# Patient Record
Sex: FEMALE | Race: ASIAN | NOT HISPANIC OR LATINO | URBAN - METROPOLITAN AREA
[De-identification: names, ages, dates, MRNs, and addresses within clinical notes are randomized per-mention and may not be internally consistent; named-entity substitution may affect disease eponyms.]

---

## 2018-07-18 PROBLEM — Z00.00 ENCOUNTER FOR PREVENTIVE HEALTH EXAMINATION: Status: ACTIVE | Noted: 2018-07-18

## 2018-07-20 ENCOUNTER — OUTPATIENT (OUTPATIENT)
Dept: OUTPATIENT SERVICES | Facility: HOSPITAL | Age: 23
LOS: 1 days | End: 2018-07-20
Payer: COMMERCIAL

## 2018-07-20 ENCOUNTER — NON-APPOINTMENT (OUTPATIENT)
Age: 23
End: 2018-07-20

## 2018-07-20 ENCOUNTER — APPOINTMENT (OUTPATIENT)
Dept: THORACIC SURGERY | Facility: CLINIC | Age: 23
End: 2018-07-20
Payer: COMMERCIAL

## 2018-07-20 VITALS
TEMPERATURE: 97.5 F | RESPIRATION RATE: 18 BRPM | OXYGEN SATURATION: 99 % | HEART RATE: 68 BPM | BODY MASS INDEX: 24.15 KG/M2 | HEIGHT: 60 IN | WEIGHT: 123 LBS | DIASTOLIC BLOOD PRESSURE: 71 MMHG | SYSTOLIC BLOOD PRESSURE: 107 MMHG

## 2018-07-20 DIAGNOSIS — F17.200 NICOTINE DEPENDENCE, UNSPECIFIED, UNCOMPLICATED: ICD-10-CM

## 2018-07-20 DIAGNOSIS — J45.909 UNSPECIFIED ASTHMA, UNCOMPLICATED: ICD-10-CM

## 2018-07-20 DIAGNOSIS — Z78.9 OTHER SPECIFIED HEALTH STATUS: ICD-10-CM

## 2018-07-20 DIAGNOSIS — Z77.098 CONTACT WITH AND (SUSPECTED) EXPOSURE TO OTHER HAZARDOUS, CHIEFLY NONMEDICINAL, CHEMICALS: ICD-10-CM

## 2018-07-20 LAB
ALBUMIN SERPL ELPH-MCNC: 4.5 G/DL — SIGNIFICANT CHANGE UP (ref 3.3–5)
ALP SERPL-CCNC: 33 U/L — LOW (ref 40–120)
ALT FLD-CCNC: 11 U/L — SIGNIFICANT CHANGE UP (ref 10–45)
ANION GAP SERPL CALC-SCNC: 13 MMOL/L — SIGNIFICANT CHANGE UP (ref 5–17)
AST SERPL-CCNC: 19 U/L — SIGNIFICANT CHANGE UP (ref 10–40)
BASOPHILS NFR BLD AUTO: 0.7 % — SIGNIFICANT CHANGE UP (ref 0–2)
BILIRUB SERPL-MCNC: 0.6 MG/DL — SIGNIFICANT CHANGE UP (ref 0.2–1.2)
BUN SERPL-MCNC: 14 MG/DL — SIGNIFICANT CHANGE UP (ref 7–23)
CALCIUM SERPL-MCNC: 9.2 MG/DL — SIGNIFICANT CHANGE UP (ref 8.4–10.5)
CHLORIDE SERPL-SCNC: 101 MMOL/L — SIGNIFICANT CHANGE UP (ref 96–108)
CHOLEST SERPL-MCNC: 216 MG/DL — HIGH (ref 10–199)
CO2 SERPL-SCNC: 25 MMOL/L — SIGNIFICANT CHANGE UP (ref 22–31)
CREAT SERPL-MCNC: 0.7 MG/DL — SIGNIFICANT CHANGE UP (ref 0.5–1.3)
EOSINOPHIL NFR BLD AUTO: 5.2 % — SIGNIFICANT CHANGE UP (ref 0–6)
GLUCOSE SERPL-MCNC: 77 MG/DL — SIGNIFICANT CHANGE UP (ref 70–99)
HBA1C BLD-MCNC: 5.5 % — SIGNIFICANT CHANGE UP (ref 4–5.6)
HBV SURFACE AG SER-ACNC: SIGNIFICANT CHANGE UP
HCG SERPL-ACNC: <.1 MIU/ML — SIGNIFICANT CHANGE UP
HCT VFR BLD CALC: 38.3 % — SIGNIFICANT CHANGE UP (ref 34.5–45)
HDLC SERPL-MCNC: 49 MG/DL — SIGNIFICANT CHANGE UP (ref 40–125)
HGB BLD-MCNC: 12.5 G/DL — SIGNIFICANT CHANGE UP (ref 11.5–15.5)
LDH SERPL L TO P-CCNC: 196 U/L — SIGNIFICANT CHANGE UP (ref 50–242)
LIPID PNL WITH DIRECT LDL SERPL: 151 MG/DL — HIGH
LYMPHOCYTES # BLD AUTO: 30.5 % — SIGNIFICANT CHANGE UP (ref 13–44)
MCHC RBC-ENTMCNC: 28.2 PG — SIGNIFICANT CHANGE UP (ref 27–34)
MCHC RBC-ENTMCNC: 32.6 G/DL — SIGNIFICANT CHANGE UP (ref 32–36)
MCV RBC AUTO: 86.3 FL — SIGNIFICANT CHANGE UP (ref 80–100)
MONOCYTES NFR BLD AUTO: 6.6 % — SIGNIFICANT CHANGE UP (ref 2–14)
NEUTROPHILS NFR BLD AUTO: 57 % — SIGNIFICANT CHANGE UP (ref 43–77)
PLATELET # BLD AUTO: 230 K/UL — SIGNIFICANT CHANGE UP (ref 150–400)
POTASSIUM SERPL-MCNC: 4.2 MMOL/L — SIGNIFICANT CHANGE UP (ref 3.5–5.3)
POTASSIUM SERPL-SCNC: 4.2 MMOL/L — SIGNIFICANT CHANGE UP (ref 3.5–5.3)
PROT SERPL-MCNC: 7.7 G/DL — SIGNIFICANT CHANGE UP (ref 6–8.3)
RBC # BLD: 4.44 M/UL — SIGNIFICANT CHANGE UP (ref 3.8–5.2)
RBC # FLD: 13.4 % — SIGNIFICANT CHANGE UP (ref 10.3–16.9)
SODIUM SERPL-SCNC: 139 MMOL/L — SIGNIFICANT CHANGE UP (ref 135–145)
TOTAL CHOLESTEROL/HDL RATIO MEASUREMENT: 4.4 RATIO — SIGNIFICANT CHANGE UP (ref 3.3–7.1)
TRIGL SERPL-MCNC: 80 MG/DL — SIGNIFICANT CHANGE UP (ref 10–149)
TSH SERPL-MCNC: 0.53 UIU/ML — SIGNIFICANT CHANGE UP (ref 0.35–4.94)
WBC # BLD: 4.1 K/UL — SIGNIFICANT CHANGE UP (ref 3.8–10.5)
WBC # FLD AUTO: 4.1 K/UL — SIGNIFICANT CHANGE UP (ref 3.8–10.5)

## 2018-07-20 PROCEDURE — 86041 ACETYLCHOLN RCPTR BNDNG ANTB: CPT

## 2018-07-20 PROCEDURE — 83036 HEMOGLOBIN GLYCOSYLATED A1C: CPT

## 2018-07-20 PROCEDURE — 80061 LIPID PANEL: CPT

## 2018-07-20 PROCEDURE — 85025 COMPLETE CBC W/AUTO DIFF WBC: CPT

## 2018-07-20 PROCEDURE — 84702 CHORIONIC GONADOTROPIN TEST: CPT

## 2018-07-20 PROCEDURE — 36415 COLL VENOUS BLD VENIPUNCTURE: CPT

## 2018-07-20 PROCEDURE — 99205 OFFICE O/P NEW HI 60 MIN: CPT

## 2018-07-20 PROCEDURE — 83615 LACTATE (LD) (LDH) ENZYME: CPT

## 2018-07-20 PROCEDURE — 82105 ALPHA-FETOPROTEIN SERUM: CPT

## 2018-07-20 PROCEDURE — 87340 HEPATITIS B SURFACE AG IA: CPT

## 2018-07-20 PROCEDURE — 84443 ASSAY THYROID STIM HORMONE: CPT

## 2018-07-20 PROCEDURE — 80053 COMPREHEN METABOLIC PANEL: CPT

## 2018-07-21 LAB — AFP-TM SERPL-MCNC: 1.3 NG/ML — SIGNIFICANT CHANGE UP

## 2018-07-23 ENCOUNTER — OUTPATIENT (OUTPATIENT)
Dept: OUTPATIENT SERVICES | Facility: HOSPITAL | Age: 23
LOS: 1 days | End: 2018-07-23
Payer: COMMERCIAL

## 2018-07-23 DIAGNOSIS — J98.59 OTHER DISEASES OF MEDIASTINUM, NOT ELSEWHERE CLASSIFIED: ICD-10-CM

## 2018-07-23 LAB
APTT BLD: 31.6 SEC — SIGNIFICANT CHANGE UP (ref 27.5–37.4)
BLD GP AB SCN SERPL QL: NEGATIVE — SIGNIFICANT CHANGE UP
BLD GP AB SCN SERPL QL: NEGATIVE — SIGNIFICANT CHANGE UP
INR BLD: 1.04 — SIGNIFICANT CHANGE UP (ref 0.88–1.16)
PROTHROM AB SERPL-ACNC: 11.5 SEC — SIGNIFICANT CHANGE UP (ref 9.8–12.7)
RH IG SCN BLD-IMP: POSITIVE — SIGNIFICANT CHANGE UP
RH IG SCN BLD-IMP: POSITIVE — SIGNIFICANT CHANGE UP

## 2018-07-23 PROCEDURE — 86850 RBC ANTIBODY SCREEN: CPT

## 2018-07-23 PROCEDURE — 85730 THROMBOPLASTIN TIME PARTIAL: CPT

## 2018-07-23 PROCEDURE — 85610 PROTHROMBIN TIME: CPT

## 2018-07-23 PROCEDURE — 86900 BLOOD TYPING SEROLOGIC ABO: CPT

## 2018-07-23 PROCEDURE — 86901 BLOOD TYPING SEROLOGIC RH(D): CPT

## 2018-07-29 ENCOUNTER — FORM ENCOUNTER (OUTPATIENT)
Age: 23
End: 2018-07-29

## 2018-07-30 ENCOUNTER — RESULT REVIEW (OUTPATIENT)
Age: 23
End: 2018-07-30

## 2018-07-30 ENCOUNTER — APPOINTMENT (OUTPATIENT)
Dept: INTERVENTIONAL RADIOLOGY/VASCULAR | Facility: HOSPITAL | Age: 23
End: 2018-07-30
Payer: COMMERCIAL

## 2018-07-30 ENCOUNTER — APPOINTMENT (OUTPATIENT)
Dept: CT IMAGING | Facility: HOSPITAL | Age: 23
End: 2018-07-30
Payer: COMMERCIAL

## 2018-07-30 ENCOUNTER — OUTPATIENT (OUTPATIENT)
Dept: OUTPATIENT SERVICES | Facility: HOSPITAL | Age: 23
LOS: 1 days | End: 2018-07-30
Payer: COMMERCIAL

## 2018-07-30 PROCEDURE — 71045 X-RAY EXAM CHEST 1 VIEW: CPT

## 2018-07-30 PROCEDURE — C1769: CPT

## 2018-07-30 PROCEDURE — 32405: CPT

## 2018-07-30 PROCEDURE — 88173 CYTOPATH EVAL FNA REPORT: CPT

## 2018-07-30 PROCEDURE — 71045 X-RAY EXAM CHEST 1 VIEW: CPT | Mod: 26

## 2018-07-30 PROCEDURE — 71046 X-RAY EXAM CHEST 2 VIEWS: CPT | Mod: 26

## 2018-07-30 PROCEDURE — 88305 TISSUE EXAM BY PATHOLOGIST: CPT

## 2018-07-30 PROCEDURE — 77012 CT SCAN FOR NEEDLE BIOPSY: CPT

## 2018-07-30 PROCEDURE — 77012 CT SCAN FOR NEEDLE BIOPSY: CPT | Mod: 26

## 2018-07-31 LAB
NON-GYNECOLOGICAL CYTOLOGY STUDY: SIGNIFICANT CHANGE UP
SURGICAL PATHOLOGY STUDY: SIGNIFICANT CHANGE UP

## 2018-08-03 ENCOUNTER — APPOINTMENT (OUTPATIENT)
Dept: THORACIC SURGERY | Facility: CLINIC | Age: 23
End: 2018-08-03
Payer: COMMERCIAL

## 2018-08-03 ENCOUNTER — NON-APPOINTMENT (OUTPATIENT)
Age: 23
End: 2018-08-03

## 2018-08-03 ENCOUNTER — OUTPATIENT (OUTPATIENT)
Dept: OUTPATIENT SERVICES | Facility: HOSPITAL | Age: 23
LOS: 1 days | End: 2018-08-03
Payer: COMMERCIAL

## 2018-08-03 VITALS
TEMPERATURE: 97.7 F | OXYGEN SATURATION: 99 % | HEART RATE: 83 BPM | SYSTOLIC BLOOD PRESSURE: 114 MMHG | WEIGHT: 124 LBS | DIASTOLIC BLOOD PRESSURE: 80 MMHG | RESPIRATION RATE: 19 BRPM

## 2018-08-03 LAB
ALBUMIN SERPL ELPH-MCNC: 4.7 G/DL — SIGNIFICANT CHANGE UP (ref 3.3–5)
ALP SERPL-CCNC: 37 U/L — LOW (ref 40–120)
ALT FLD-CCNC: 15 U/L — SIGNIFICANT CHANGE UP (ref 10–45)
ANION GAP SERPL CALC-SCNC: 14 MMOL/L — SIGNIFICANT CHANGE UP (ref 5–17)
APPEARANCE UR: ABNORMAL
APTT BLD: 34.9 SEC — SIGNIFICANT CHANGE UP (ref 27.5–37.4)
AST SERPL-CCNC: 19 U/L — SIGNIFICANT CHANGE UP (ref 10–40)
BASOPHILS NFR BLD AUTO: 0.4 % — SIGNIFICANT CHANGE UP (ref 0–2)
BILIRUB SERPL-MCNC: 0.8 MG/DL — SIGNIFICANT CHANGE UP (ref 0.2–1.2)
BILIRUB UR-MCNC: NEGATIVE — SIGNIFICANT CHANGE UP
BLD GP AB SCN SERPL QL: NEGATIVE — SIGNIFICANT CHANGE UP
BUN SERPL-MCNC: 15 MG/DL — SIGNIFICANT CHANGE UP (ref 7–23)
CALCIUM SERPL-MCNC: 9.7 MG/DL — SIGNIFICANT CHANGE UP (ref 8.4–10.5)
CHLORIDE SERPL-SCNC: 99 MMOL/L — SIGNIFICANT CHANGE UP (ref 96–108)
CHOLEST SERPL-MCNC: 196 MG/DL — SIGNIFICANT CHANGE UP (ref 10–199)
CO2 SERPL-SCNC: 26 MMOL/L — SIGNIFICANT CHANGE UP (ref 22–31)
COLOR SPEC: YELLOW — SIGNIFICANT CHANGE UP
CREAT SERPL-MCNC: 0.68 MG/DL — SIGNIFICANT CHANGE UP (ref 0.5–1.3)
DIFF PNL FLD: NEGATIVE — SIGNIFICANT CHANGE UP
EOSINOPHIL NFR BLD AUTO: 6.9 % — HIGH (ref 0–6)
EXTRA SST TUBE: SIGNIFICANT CHANGE UP
GLUCOSE SERPL-MCNC: 69 MG/DL — LOW (ref 70–99)
GLUCOSE UR QL: NEGATIVE — SIGNIFICANT CHANGE UP
HBA1C BLD-MCNC: 5.3 % — SIGNIFICANT CHANGE UP (ref 4–5.6)
HCT VFR BLD CALC: 38.3 % — SIGNIFICANT CHANGE UP (ref 34.5–45)
HDLC SERPL-MCNC: 45 MG/DL — SIGNIFICANT CHANGE UP (ref 40–125)
HGB BLD-MCNC: 12.6 G/DL — SIGNIFICANT CHANGE UP (ref 11.5–15.5)
INR BLD: 1.04 — SIGNIFICANT CHANGE UP (ref 0.88–1.16)
KETONES UR-MCNC: NEGATIVE — SIGNIFICANT CHANGE UP
LEUKOCYTE ESTERASE UR-ACNC: NEGATIVE — SIGNIFICANT CHANGE UP
LIPID PNL WITH DIRECT LDL SERPL: 111 MG/DL — SIGNIFICANT CHANGE UP
LYMPHOCYTES # BLD AUTO: 21.1 % — SIGNIFICANT CHANGE UP (ref 13–44)
MCHC RBC-ENTMCNC: 28.1 PG — SIGNIFICANT CHANGE UP (ref 27–34)
MCHC RBC-ENTMCNC: 32.9 G/DL — SIGNIFICANT CHANGE UP (ref 32–36)
MCV RBC AUTO: 85.3 FL — SIGNIFICANT CHANGE UP (ref 80–100)
MONOCYTES NFR BLD AUTO: 11.6 % — SIGNIFICANT CHANGE UP (ref 2–14)
NEUTROPHILS NFR BLD AUTO: 60 % — SIGNIFICANT CHANGE UP (ref 43–77)
NITRITE UR-MCNC: NEGATIVE — SIGNIFICANT CHANGE UP
PH UR: 7 — SIGNIFICANT CHANGE UP (ref 5–8)
PLATELET # BLD AUTO: 248 K/UL — SIGNIFICANT CHANGE UP (ref 150–400)
POTASSIUM SERPL-MCNC: 4.3 MMOL/L — SIGNIFICANT CHANGE UP (ref 3.5–5.3)
POTASSIUM SERPL-SCNC: 4.3 MMOL/L — SIGNIFICANT CHANGE UP (ref 3.5–5.3)
PROT SERPL-MCNC: 8 G/DL — SIGNIFICANT CHANGE UP (ref 6–8.3)
PROT UR-MCNC: NEGATIVE MG/DL — SIGNIFICANT CHANGE UP
PROTHROM AB SERPL-ACNC: 11.6 SEC — SIGNIFICANT CHANGE UP (ref 9.8–12.7)
RBC # BLD: 4.49 M/UL — SIGNIFICANT CHANGE UP (ref 3.8–5.2)
RBC # FLD: 13.2 % — SIGNIFICANT CHANGE UP (ref 10.3–16.9)
RH IG SCN BLD-IMP: POSITIVE — SIGNIFICANT CHANGE UP
SODIUM SERPL-SCNC: 139 MMOL/L — SIGNIFICANT CHANGE UP (ref 135–145)
SP GR SPEC: 1.01 — SIGNIFICANT CHANGE UP (ref 1–1.03)
TOTAL CHOLESTEROL/HDL RATIO MEASUREMENT: 4.4 RATIO — SIGNIFICANT CHANGE UP (ref 3.3–7.1)
TRIGL SERPL-MCNC: 199 MG/DL — HIGH (ref 10–149)
TSH SERPL-MCNC: 0.36 UIU/ML — SIGNIFICANT CHANGE UP (ref 0.35–4.94)
UROBILINOGEN FLD QL: 0.2 E.U./DL — SIGNIFICANT CHANGE UP
WBC # BLD: 4.9 K/UL — SIGNIFICANT CHANGE UP (ref 3.8–10.5)
WBC # FLD AUTO: 4.9 K/UL — SIGNIFICANT CHANGE UP (ref 3.8–10.5)

## 2018-08-03 PROCEDURE — 85610 PROTHROMBIN TIME: CPT

## 2018-08-03 PROCEDURE — 84443 ASSAY THYROID STIM HORMONE: CPT

## 2018-08-03 PROCEDURE — 80053 COMPREHEN METABOLIC PANEL: CPT

## 2018-08-03 PROCEDURE — 85730 THROMBOPLASTIN TIME PARTIAL: CPT

## 2018-08-03 PROCEDURE — 99215 OFFICE O/P EST HI 40 MIN: CPT

## 2018-08-03 PROCEDURE — 85025 COMPLETE CBC W/AUTO DIFF WBC: CPT

## 2018-08-03 PROCEDURE — 86900 BLOOD TYPING SEROLOGIC ABO: CPT

## 2018-08-03 PROCEDURE — 86901 BLOOD TYPING SEROLOGIC RH(D): CPT

## 2018-08-03 PROCEDURE — 83036 HEMOGLOBIN GLYCOSYLATED A1C: CPT

## 2018-08-03 PROCEDURE — 86850 RBC ANTIBODY SCREEN: CPT

## 2018-08-03 PROCEDURE — 80061 LIPID PANEL: CPT

## 2018-08-06 DIAGNOSIS — J98.59 OTHER DISEASES OF MEDIASTINUM, NOT ELSEWHERE CLASSIFIED: ICD-10-CM

## 2018-08-07 LAB — ACRM MODULATING ANTIBODY: SIGNIFICANT CHANGE UP

## 2018-08-30 VITALS
WEIGHT: 128.31 LBS | SYSTOLIC BLOOD PRESSURE: 103 MMHG | DIASTOLIC BLOOD PRESSURE: 63 MMHG | OXYGEN SATURATION: 100 % | HEART RATE: 67 BPM | TEMPERATURE: 97 F | HEIGHT: 62 IN | RESPIRATION RATE: 18 BRPM

## 2018-08-30 NOTE — PRE-OP CHECKLIST - SELECT TESTS ORDERED
Type and Screen/PT/PTT/EKG/CXR/CBC/CMP/INR/Results in MD note Results in MD note/EKG/CXR/CMP/PT/PTT/INR/CBC/Type and Screen/ucg- Results in MD note/EKG/PT/PTT/Type and Screen/CXR/INR/CBC/CMP/ucg-negative Results in MD note/Type and Screen/CBC/PT/PTT/INR/EKG/CXR/CMP/ucg-negative, neuro clearance

## 2018-08-30 NOTE — PATIENT PROFILE ADULT. - PMH
Childhood asthma Childhood asthma    Seizure  pt reports seizure as infant post last surgry at 2 months old

## 2018-08-31 ENCOUNTER — RESULT REVIEW (OUTPATIENT)
Age: 23
End: 2018-08-31

## 2018-08-31 ENCOUNTER — APPOINTMENT (OUTPATIENT)
Dept: THORACIC SURGERY | Facility: HOSPITAL | Age: 23
End: 2018-08-31

## 2018-08-31 ENCOUNTER — INPATIENT (INPATIENT)
Facility: HOSPITAL | Age: 23
LOS: 2 days | Discharge: HOME | DRG: 164 | End: 2018-09-03
Attending: THORACIC SURGERY (CARDIOTHORACIC VASCULAR SURGERY) | Admitting: THORACIC SURGERY (CARDIOTHORACIC VASCULAR SURGERY)
Payer: COMMERCIAL

## 2018-08-31 DIAGNOSIS — Z87.448 PERSONAL HISTORY OF OTHER DISEASES OF URINARY SYSTEM: Chronic | ICD-10-CM

## 2018-08-31 LAB
ANION GAP SERPL CALC-SCNC: 12 MMOL/L — SIGNIFICANT CHANGE UP (ref 5–17)
APTT BLD: 27.4 SEC — LOW (ref 27.5–37.4)
BASOPHILS NFR BLD AUTO: 0.1 % — SIGNIFICANT CHANGE UP (ref 0–2)
BUN SERPL-MCNC: 12 MG/DL — SIGNIFICANT CHANGE UP (ref 7–23)
CALCIUM SERPL-MCNC: 8.2 MG/DL — LOW (ref 8.4–10.5)
CHLORIDE SERPL-SCNC: 100 MMOL/L — SIGNIFICANT CHANGE UP (ref 96–108)
CO2 SERPL-SCNC: 23 MMOL/L — SIGNIFICANT CHANGE UP (ref 22–31)
CREAT SERPL-MCNC: 0.71 MG/DL — SIGNIFICANT CHANGE UP (ref 0.5–1.3)
EOSINOPHIL NFR BLD AUTO: 0.1 % — SIGNIFICANT CHANGE UP (ref 0–6)
GLUCOSE SERPL-MCNC: 135 MG/DL — HIGH (ref 70–99)
HCT VFR BLD CALC: 31.6 % — LOW (ref 34.5–45)
HGB BLD-MCNC: 10.3 G/DL — LOW (ref 11.5–15.5)
INR BLD: 1.11 — SIGNIFICANT CHANGE UP (ref 0.88–1.16)
LYMPHOCYTES # BLD AUTO: 8.2 % — LOW (ref 13–44)
MAGNESIUM SERPL-MCNC: 1.6 MG/DL — SIGNIFICANT CHANGE UP (ref 1.6–2.6)
MCHC RBC-ENTMCNC: 28.2 PG — SIGNIFICANT CHANGE UP (ref 27–34)
MCHC RBC-ENTMCNC: 32.6 G/DL — SIGNIFICANT CHANGE UP (ref 32–36)
MCV RBC AUTO: 86.6 FL — SIGNIFICANT CHANGE UP (ref 80–100)
MONOCYTES NFR BLD AUTO: 0.9 % — LOW (ref 2–14)
NEUTROPHILS NFR BLD AUTO: 90.7 % — HIGH (ref 43–77)
PLATELET # BLD AUTO: 206 K/UL — SIGNIFICANT CHANGE UP (ref 150–400)
POTASSIUM SERPL-MCNC: 4.6 MMOL/L — SIGNIFICANT CHANGE UP (ref 3.5–5.3)
POTASSIUM SERPL-SCNC: 4.6 MMOL/L — SIGNIFICANT CHANGE UP (ref 3.5–5.3)
PROTHROM AB SERPL-ACNC: 12.3 SEC — SIGNIFICANT CHANGE UP (ref 9.8–12.7)
RBC # BLD: 3.65 M/UL — LOW (ref 3.8–5.2)
RBC # FLD: 13.7 % — SIGNIFICANT CHANGE UP (ref 10.3–16.9)
SODIUM SERPL-SCNC: 135 MMOL/L — SIGNIFICANT CHANGE UP (ref 135–145)
WBC # BLD: 12.6 K/UL — HIGH (ref 3.8–10.5)
WBC # FLD AUTO: 12.6 K/UL — HIGH (ref 3.8–10.5)

## 2018-08-31 PROCEDURE — S2900 ROBOTIC SURGICAL SYSTEM: CPT | Mod: NC

## 2018-08-31 PROCEDURE — 32662 THORACOSCOPY W/MEDIAST EXC: CPT | Mod: 59

## 2018-08-31 PROCEDURE — 71045 X-RAY EXAM CHEST 1 VIEW: CPT | Mod: 26

## 2018-08-31 PROCEDURE — 32673 THORACOSCOPY W/THYMUS RESECT: CPT | Mod: 22

## 2018-08-31 PROCEDURE — 32673 THORACOSCOPY W/THYMUS RESECT: CPT | Mod: AS

## 2018-08-31 PROCEDURE — S2900 ROBOTIC SURGICAL SYSTEM: CPT | Mod: AS

## 2018-08-31 PROCEDURE — 32662 THORACOSCOPY W/MEDIAST EXC: CPT | Mod: AS,59

## 2018-08-31 RX ORDER — BUPIVACAINE 13.3 MG/ML
20 INJECTION, SUSPENSION, LIPOSOMAL INFILTRATION ONCE
Qty: 0 | Refills: 0 | Status: DISCONTINUED | OUTPATIENT
Start: 2018-08-31 | End: 2018-09-01

## 2018-08-31 RX ORDER — KETOROLAC TROMETHAMINE 30 MG/ML
30 SYRINGE (ML) INJECTION EVERY 6 HOURS
Qty: 0 | Refills: 0 | Status: DISCONTINUED | OUTPATIENT
Start: 2018-08-31 | End: 2018-09-01

## 2018-08-31 RX ORDER — SODIUM CHLORIDE 9 MG/ML
1000 INJECTION, SOLUTION INTRAVENOUS
Qty: 0 | Refills: 0 | Status: DISCONTINUED | OUTPATIENT
Start: 2018-08-31 | End: 2018-09-01

## 2018-08-31 RX ORDER — HEPARIN SODIUM 5000 [USP'U]/ML
5000 INJECTION INTRAVENOUS; SUBCUTANEOUS EVERY 8 HOURS
Qty: 0 | Refills: 0 | Status: DISCONTINUED | OUTPATIENT
Start: 2018-08-31 | End: 2018-09-03

## 2018-08-31 RX ORDER — ACETAMINOPHEN 500 MG
1000 TABLET ORAL ONCE
Qty: 0 | Refills: 0 | Status: COMPLETED | OUTPATIENT
Start: 2018-08-31 | End: 2018-09-01

## 2018-08-31 RX ORDER — MAGNESIUM OXIDE 400 MG ORAL TABLET 241.3 MG
800 TABLET ORAL ONCE
Qty: 0 | Refills: 0 | Status: COMPLETED | OUTPATIENT
Start: 2018-08-31 | End: 2018-08-31

## 2018-08-31 RX ORDER — HYDROMORPHONE HYDROCHLORIDE 2 MG/ML
1 INJECTION INTRAMUSCULAR; INTRAVENOUS; SUBCUTANEOUS
Qty: 0 | Refills: 0 | Status: DISCONTINUED | OUTPATIENT
Start: 2018-08-31 | End: 2018-09-01

## 2018-08-31 RX ORDER — CEFAZOLIN SODIUM 1 G
2000 VIAL (EA) INJECTION EVERY 8 HOURS
Qty: 0 | Refills: 0 | Status: DISCONTINUED | OUTPATIENT
Start: 2018-08-31 | End: 2018-08-31

## 2018-08-31 RX ORDER — ONDANSETRON 8 MG/1
4 TABLET, FILM COATED ORAL ONCE
Qty: 0 | Refills: 0 | Status: COMPLETED | OUTPATIENT
Start: 2018-08-31 | End: 2018-08-31

## 2018-08-31 RX ORDER — DOCUSATE SODIUM 100 MG
100 CAPSULE ORAL THREE TIMES A DAY
Qty: 0 | Refills: 0 | Status: DISCONTINUED | OUTPATIENT
Start: 2018-08-31 | End: 2018-09-03

## 2018-08-31 RX ORDER — ONDANSETRON 8 MG/1
4 TABLET, FILM COATED ORAL EVERY 4 HOURS
Qty: 0 | Refills: 0 | Status: DISCONTINUED | OUTPATIENT
Start: 2018-08-31 | End: 2018-09-03

## 2018-08-31 RX ORDER — KETOROLAC TROMETHAMINE 30 MG/ML
15 SYRINGE (ML) INJECTION ONCE
Qty: 0 | Refills: 0 | Status: DISCONTINUED | OUTPATIENT
Start: 2018-08-31 | End: 2018-08-31

## 2018-08-31 RX ORDER — LIDOCAINE 4 G/100G
1 CREAM TOPICAL DAILY
Qty: 0 | Refills: 0 | Status: DISCONTINUED | OUTPATIENT
Start: 2018-08-31 | End: 2018-09-03

## 2018-08-31 RX ORDER — SODIUM CHLORIDE 9 MG/ML
1000 INJECTION, SOLUTION INTRAVENOUS
Qty: 0 | Refills: 0 | Status: DISCONTINUED | OUTPATIENT
Start: 2018-08-31 | End: 2018-09-03

## 2018-08-31 RX ORDER — METOCLOPRAMIDE HCL 10 MG
10 TABLET ORAL ONCE
Qty: 0 | Refills: 0 | Status: COMPLETED | OUTPATIENT
Start: 2018-08-31 | End: 2018-08-31

## 2018-08-31 RX ORDER — KETOROLAC TROMETHAMINE 30 MG/ML
15 SYRINGE (ML) INJECTION EVERY 6 HOURS
Qty: 0 | Refills: 0 | Status: DISCONTINUED | OUTPATIENT
Start: 2018-08-31 | End: 2018-08-31

## 2018-08-31 RX ORDER — ACETAMINOPHEN 500 MG
1000 TABLET ORAL ONCE
Qty: 0 | Refills: 0 | Status: COMPLETED | OUTPATIENT
Start: 2018-08-31 | End: 2018-08-31

## 2018-08-31 RX ORDER — FAMOTIDINE 10 MG/ML
20 INJECTION INTRAVENOUS
Qty: 0 | Refills: 0 | Status: DISCONTINUED | OUTPATIENT
Start: 2018-08-31 | End: 2018-09-03

## 2018-08-31 RX ORDER — CEFAZOLIN SODIUM 1 G
2000 VIAL (EA) INJECTION EVERY 8 HOURS
Qty: 0 | Refills: 0 | Status: COMPLETED | OUTPATIENT
Start: 2018-08-31 | End: 2018-09-01

## 2018-08-31 RX ADMIN — Medication 30 MILLIGRAM(S): at 23:42

## 2018-08-31 RX ADMIN — Medication 2000 MILLIGRAM(S): at 21:38

## 2018-08-31 RX ADMIN — Medication 1000 MILLIGRAM(S): at 16:30

## 2018-08-31 RX ADMIN — ONDANSETRON 4 MILLIGRAM(S): 8 TABLET, FILM COATED ORAL at 13:14

## 2018-08-31 RX ADMIN — Medication 30 MILLIGRAM(S): at 22:42

## 2018-08-31 RX ADMIN — Medication 15 MILLIGRAM(S): at 19:31

## 2018-08-31 RX ADMIN — Medication 15 MILLIGRAM(S): at 18:10

## 2018-08-31 RX ADMIN — Medication 2000 MILLIGRAM(S): at 13:46

## 2018-08-31 RX ADMIN — LIDOCAINE 1 PATCH: 4 CREAM TOPICAL at 15:22

## 2018-08-31 RX ADMIN — ONDANSETRON 4 MILLIGRAM(S): 8 TABLET, FILM COATED ORAL at 19:53

## 2018-08-31 RX ADMIN — MAGNESIUM OXIDE 400 MG ORAL TABLET 800 MILLIGRAM(S): 241.3 TABLET ORAL at 18:23

## 2018-08-31 RX ADMIN — Medication 10 MILLIGRAM(S): at 15:21

## 2018-08-31 RX ADMIN — Medication 15 MILLIGRAM(S): at 18:35

## 2018-08-31 RX ADMIN — Medication 100 MILLIGRAM(S): at 21:38

## 2018-08-31 RX ADMIN — Medication 15 MILLIGRAM(S): at 18:38

## 2018-08-31 RX ADMIN — HEPARIN SODIUM 5000 UNIT(S): 5000 INJECTION INTRAVENOUS; SUBCUTANEOUS at 21:38

## 2018-08-31 RX ADMIN — Medication 400 MILLIGRAM(S): at 15:21

## 2018-08-31 RX ADMIN — HEPARIN SODIUM 5000 UNIT(S): 5000 INJECTION INTRAVENOUS; SUBCUTANEOUS at 14:19

## 2018-08-31 RX ADMIN — FAMOTIDINE 20 MILLIGRAM(S): 10 INJECTION INTRAVENOUS at 18:23

## 2018-08-31 NOTE — H&P ADULT - NSHPPHYSICALEXAM_GEN_ALL_CORE
CONSTITUTIONAL:    WNL  NEURO:  WNL                      EYES:   WNL  ENMT:   WNL  CV:   WNL  RESPIRATORY:  WNL  GI:  WNL  :  WNL  MUSKULOSKELETAL:  WNL  SKIN / BREAST:  WNL

## 2018-08-31 NOTE — PROGRESS NOTE ADULT - SUBJECTIVE AND OBJECTIVE BOX
POST-OP RECOVERY NOTE    Operation / Date: R VATS Removal of Anterior Mediastinal Mass    SUBJECTIVE ASSESSMENT:  23y Female     Vital Signs Last 24 Hrs  T(C): 36.2 (31 Aug 2018 12:20), Max: 97.2 (31 Aug 2018 12:20)  T(F): 97.2 (31 Aug 2018 12:20), Max: 97.2 (31 Aug 2018 12:20)  HR: 102 (31 Aug 2018 14:05) (90 - 102)  BP: 118/75 (31 Aug 2018 14:05) (109/65 - 119/75)  BP(mean): 88 (31 Aug 2018 12:35) (88 - 94)  RR: 14 (31 Aug 2018 14:05) (8 - 21)  SpO2: 98% (31 Aug 2018 14:05) (98% - 99%)  I&O's Detail    31 Aug 2018 07:01  -  31 Aug 2018 14:26  --------------------------------------------------------  IN:    lactated ringers.: 150 mL  Total IN: 150 mL    OUT:    Chest Tube: 70 mL    Voided: 25 mL  Total OUT: 95 mL    Total NET: 55 mL      CHEST TUBE:  Yes/No. AIR LEAKS: Yes/No. Suction / H2O SEAL.   ROSALIE DRAIN:  Yes/No.  EPICARDIAL WIRES: Yes/No.  TIE DOWNS: Yes/No.  BURGOS: Yes/No.    PHYSICAL EXAM:  General:   Neurological:  Cardiovascular:  Respiratory:  Gastrointestinal:  Extremities:  Vascular:  Incision Sites:    LABS:                        10.3   12.6  )-----------( 206      ( 31 Aug 2018 13:27 )             31.6       COUMADIN:  Yes/No. REASON: .    PT/INR - ( 31 Aug 2018 13:27 )   PT: 12.3 sec;   INR: 1.11          PTT - ( 31 Aug 2018 13:27 )  PTT:27.4 sec    08-31    135  |  100  |  12  ----------------------------<  135<H>  4.6   |  23  |  0.71    Ca    8.2<L>      31 Aug 2018 13:27  Mg     1.6     08-31      MEDICATIONS  (STANDING):  acetaminophen  IVPB. 1000 milliGRAM(s) IV Intermittent once  BUpivacaine liposome 1.3% Injectable (no eMAR) 20 milliLiter(s) Local Injection once  ceFAZolin  Injectable. 2000 milliGRAM(s) IV Push every 8 hours  docusate sodium 100 milliGRAM(s) Oral three times a day  famotidine    Tablet 20 milliGRAM(s) Oral two times a day  heparin  Injectable 5000 Unit(s) SubCutaneous every 8 hours  lactated ringers. 1000 milliLiter(s) (50 mL/Hr) IV Continuous <Continuous>  lidocaine   Patch 1 Patch Transdermal daily  magnesium oxide 800 milliGRAM(s) Oral once  multiple electrolytes Injection Type 1 1000 milliLiter(s) (250 mL/Hr) IV Continuous <Continuous>    MEDICATIONS  (PRN):      RADIOLOGY & ADDITIONAL TESTS: POST-OP RECOVERY NOTE    Operation / Date: R VATS Removal of Anterior Mediastinal Mass    SUBJECTIVE ASSESSMENT:  23y Female seen at bedside post-operatively.  She reports moderate incisional pain but feels that she can stand it at the moment and doesn't want to take pain medications.  She did have some nausea and 2 episodes of vomiting immediately post-op but states that this has resolved completely after zofran given.  Denies HA, AMS, CP, palpitations, SOB, cough, hemoptysis, fever.     Vital Signs Last 24 Hrs  T(C): 36.2 (31 Aug 2018 12:20), Max: 97.2 (31 Aug 2018 12:20)  T(F): 97.2 (31 Aug 2018 12:20), Max: 97.2 (31 Aug 2018 12:20)  HR: 102 (31 Aug 2018 14:05) (90 - 102)  BP: 118/75 (31 Aug 2018 14:05) (109/65 - 119/75)  BP(mean): 88 (31 Aug 2018 12:35) (88 - 94)  RR: 14 (31 Aug 2018 14:05) (8 - 21)  SpO2: 98% (31 Aug 2018 14:05) (98% - 99%)  I&O's Detail    31 Aug 2018 07:01  -  31 Aug 2018 14:26  --------------------------------------------------------  IN:    lactated ringers.: 150 mL  Total IN: 150 mL    OUT:    Chest Tube: 70 mL    Voided: 25 mL  Total OUT: 95 mL    Total NET: 55 mL    CHEST TUBE:  Yes- R lateral chest wall, no air leak on suction.   TIE DOWNS: Yes.  BURGOS: No.     PHYSICAL EXAM:  General: Resting comfortably in bed, no acute distress.   Neurological: AAOx3, no AMS or focal deficits. Answers questions appropriately, responds appropriately to verbal and physical stimuli.   Cardiovascular: RRR, S1/S2, no m/r/g.   Respiratory: No acute distress on 3L NC.  Chest tube evident on R lateral chest wall, no crepitus.  CTA b/l in anterior lung fields, unable to auscultate posterior lung fields.   Gastrointestinal: ND, NBS, non-TTP.  Extremities: Warm and well perfused, no calf ttp b/l.  No edema b/l.   Vascular: Pulses 2+ throughout.   Incision sites: R VATS: C/D/I    LABS:                        10.3   12.6  )-----------( 206      ( 31 Aug 2018 13:27 )             31.6       COUMADIN:  No.  PT/INR - ( 31 Aug 2018 13:27 )   PT: 12.3 sec;   INR: 1.11          PTT - ( 31 Aug 2018 13:27 )  PTT:27.4 sec    08-31    135  |  100  |  12  ----------------------------<  135<H>  4.6   |  23  |  0.71    Ca    8.2<L>      31 Aug 2018 13:27  Mg     1.6     08-31    MEDICATIONS  (STANDING):  acetaminophen  IVPB. 1000 milliGRAM(s) IV Intermittent once  BUpivacaine liposome 1.3% Injectable (no eMAR) 20 milliLiter(s) Local Injection once  ceFAZolin  Injectable. 2000 milliGRAM(s) IV Push every 8 hours  docusate sodium 100 milliGRAM(s) Oral three times a day  famotidine    Tablet 20 milliGRAM(s) Oral two times a day  heparin  Injectable 5000 Unit(s) SubCutaneous every 8 hours  lactated ringers. 1000 milliLiter(s) (50 mL/Hr) IV Continuous <Continuous>  lidocaine   Patch 1 Patch Transdermal daily  magnesium oxide 800 milliGRAM(s) Oral once  multiple electrolytes Injection Type 1 1000 milliLiter(s) (250 mL/Hr) IV Continuous <Continuous>    MEDICATIONS  (PRN):      RADIOLOGY & ADDITIONAL TESTS: POST-OP RECOVERY NOTE    Operation / Date: R VATS RA Removal of Anterior Mediastinal Mass    SUBJECTIVE ASSESSMENT:  23y Female seen at bedside post-operatively.  She reports moderate incisional pain but feels that she can stand it at the moment and doesn't want to take pain medications.  She did have some nausea and 2 episodes of vomiting immediately post-op but states that this has resolved completely after zofran given.  Denies HA, AMS, CP, palpitations, SOB, cough, hemoptysis, fever.     Vital Signs Last 24 Hrs  T(C): 36.2 (31 Aug 2018 12:20), Max: 97.2 (31 Aug 2018 12:20)  T(F): 97.2 (31 Aug 2018 12:20), Max: 97.2 (31 Aug 2018 12:20)  HR: 102 (31 Aug 2018 14:05) (90 - 102)  BP: 118/75 (31 Aug 2018 14:05) (109/65 - 119/75)  BP(mean): 88 (31 Aug 2018 12:35) (88 - 94)  RR: 14 (31 Aug 2018 14:05) (8 - 21)  SpO2: 98% (31 Aug 2018 14:05) (98% - 99%)  I&O's Detail    31 Aug 2018 07:01  -  31 Aug 2018 14:26  --------------------------------------------------------  IN:    lactated ringers.: 150 mL  Total IN: 150 mL    OUT:    Chest Tube: 70 mL    Voided: 25 mL  Total OUT: 95 mL    Total NET: 55 mL    CHEST TUBE:  Yes- R lateral chest wall, no air leak on suction.   TIE DOWNS: Yes.  BURGOS: No.     PHYSICAL EXAM:  General: Resting comfortably in bed, no acute distress.   Neurological: AAOx3, no AMS or focal deficits. Answers questions appropriately, responds appropriately to verbal and physical stimuli.   Cardiovascular: RRR, S1/S2, no m/r/g.   Respiratory: No acute distress on 3L NC.  Chest tube evident on R lateral chest wall, no crepitus.  CTA b/l in anterior lung fields, unable to auscultate posterior lung fields.   Gastrointestinal: ND, NBS, non-TTP.  Extremities: Warm and well perfused, no calf ttp b/l.  No edema b/l.   Vascular: Pulses 2+ throughout.   Incision sites: R VATS: C/D/I    LABS:                        10.3   12.6  )-----------( 206      ( 31 Aug 2018 13:27 )             31.6       COUMADIN:  No.  PT/INR - ( 31 Aug 2018 13:27 )   PT: 12.3 sec;   INR: 1.11          PTT - ( 31 Aug 2018 13:27 )  PTT:27.4 sec    08-31    135  |  100  |  12  ----------------------------<  135<H>  4.6   |  23  |  0.71    Ca    8.2<L>      31 Aug 2018 13:27  Mg     1.6     08-31    MEDICATIONS  (STANDING):  acetaminophen  IVPB. 1000 milliGRAM(s) IV Intermittent once  BUpivacaine liposome 1.3% Injectable (no eMAR) 20 milliLiter(s) Local Injection once  ceFAZolin  Injectable. 2000 milliGRAM(s) IV Push every 8 hours  docusate sodium 100 milliGRAM(s) Oral three times a day  famotidine    Tablet 20 milliGRAM(s) Oral two times a day  heparin  Injectable 5000 Unit(s) SubCutaneous every 8 hours  lactated ringers. 1000 milliLiter(s) (50 mL/Hr) IV Continuous <Continuous>  lidocaine   Patch 1 Patch Transdermal daily  magnesium oxide 800 milliGRAM(s) Oral once  multiple electrolytes Injection Type 1 1000 milliLiter(s) (250 mL/Hr) IV Continuous <Continuous>    MEDICATIONS  (PRN):      RADIOLOGY & ADDITIONAL TESTS:

## 2018-08-31 NOTE — H&P ADULT - ASSESSMENT
24 y/o female with hx of social smoking( 1cigarette q6months x2 years), hx of childhood asthma, hx of seizures at 2 months old who initially presented with cough x3 months treated with Z saadia and Doxy with no improvement. She was sent for a CXR on 7/12/18 which showed a 8.2 cm convex opacity projecting over the Rt Hilar/suprahilar region. CT chest 7/17 revealed a 5.6 x 3.7 x 6.8 cm Right anterior mediastinal amorphous mass, directly adjacent to the lt brachiocephalic vein, mildly heterogeneous and punctate calcifications with focal perifissural RML scarring, and multiple splenic hypodense lesions measuring up to 2.2 cm. Patient is s/p FNA of the mediastinal mass which was negative for malignant cells and mediastinum showed fibroadipose tissue and smooth muscle with foca pigmented macrophage and lymphoplasmacytic infiltrate. She underwent medical and neurology clearance and now presents for her planned Right VATS, RA resection of anterior mediastinal mass.       - pt NPO since midnight  - chart reviewed  - consent obtained  - 2 PRBC on hold for OR  - beta Hcg negative  - plan for PACU/9LA post op

## 2018-08-31 NOTE — H&P ADULT - HISTORY OF PRESENT ILLNESS
24 y/o female with hx of social smoking( 1cigarette q6months x2 years), hx of childhood asthma, hx of seizures at 2 months old who initially presented with cough x3 months treated with Z saadia and Doxy with no improvement. She was sent for a CXR on 7/12/18 which showed a 8.2 cm convex opacity projecting over the Rt Hilar/suprahilar region. CT chest 7/17 revealed a 5.6 x 3.7 x 6.8 cm Right anterior mediastinal amorphous mass, directly adjacent to the lt brachiocephalic vein, mildly heterogeneous and punctate calcifications with focal perifissural RML scarring, and multiple splenic hypodense lesions measuring up to 2.2 cm. Patient is s/p FNA of the mediastinal mass which was negative for malignant cells and mediastinum showed fibroadipose tissue and smooth muscle with foca pigmented macrophage and lymphoplasmacytic infiltrate. She underwent medical and neurology clearance and now presents for her planned Right VATS, RA resection of anterior mediastinal mass. She was seen in [facility] and denies any acute complaints and is in her usual state of health.

## 2018-08-31 NOTE — H&P ADULT - NSHPREVIEWOFSYSTEMS_GEN_ALL_CORE
Review of Systems  CONSTITUTIONAL:  Denies Fevers / chills, sweats, fatigue, weight loss, weight gain                                      NEURO:  Denies parathesias, seizures, syncope, confusion                                                                                EYES:  Denies Blurry vision, discharge, pain, loss of vision                                                                                    ENMT:  Denies Difficulty hearing, vertigo, dysphagia, epistaxis, recent dental work                                       CV:  Denies Chest pain, palpitations, STRATTON, orthopnea                                                                                          RESPIRATORY:  Denies Wheezing, SOB, cough / sputum, hemoptysis                                                                GI:  Denies Nausea, vommiting, diarrhea, constipation, melena, difficulty swallowing                                               : Denies Hematuria, dysuria, urgency, incontinence                                                                                         MUSKULOSKELETAL:  Denies arthritis, joint swelling, muscle weakness                                                             SKIN/BREAST:  Denies rash, itching, alex loss, masses                                                                                            PSYCH:  Denies depresion, anxiety, suicidal ideation                                                                                               HEME/LYMPH:  Denies bruises easily, enlarged lymph nodes, tender lymph nodes                                        ENDOCRINE:  Denies cold intolerance, heat intolerance, polydipsia

## 2018-08-31 NOTE — BRIEF OPERATIVE NOTE - PROCEDURE
<<-----Click on this checkbox to enter Procedure VATS, robot-assisted  08/31/2018  Right VATS, RA, removal of anterior mediastinal mass  Active  AARTEMOU

## 2018-08-31 NOTE — PROGRESS NOTE ADULT - ASSESSMENT
23 year old female with history of social     22 y/o female with hx of social smoking( 1cigarette q6months x2 years), hx of childhood asthma, hx of seizures at 2 months old who initially presented with cough x3 months treated with Z saadia and Doxy with no improvement. She was sent for a CXR on 7/12/18 which showed a 8.2 cm convex opacity projecting over the Rt Hilar/suprahilar region. CT chest 7/17 revealed a 5.6 x 3.7 x 6.8 cm Right anterior mediastinal amorphous mass, directly adjacent to the lt brachiocephalic vein, mildly heterogeneous and punctate calcifications with focal perifissural RML scarring, and multiple splenic hypodense lesions measuring up to 2.2 cm. Patient is s/p FNA of the mediastinal mass which was negative for malignant cells and mediastinum showed fibroadipose tissue and smooth muscle with foca pigmented macrophage and lymphoplasmacytic infiltrate. She underwent medical and neurology clearance and now presents for her planned Right VATS, RA resection of anterior mediastinal mass. 23 year old female, social infrequent smoker, with history of childhood asthma, seizures as an infant who initially presented with cough x 3 months not improved with antibiotic treatment with CXR on 7/12/18 showing 8.2cm right anterior chest mass and CT Chest on 7/17/18    22 y/o female with hx of social smoking( 1cigarette q6months x2 years), hx of childhood asthma, hx of seizures at 2 months old who initially presented with cough x3 months treated with Z saadia and Doxy with no improvement. She was sent for a CXR on 7/12/18 which showed a 8.2 cm convex opacity projecting over the Rt Hilar/suprahilar region. CT chest 7/17 revealed a 5.6 x 3.7 x 6.8 cm Right anterior mediastinal amorphous mass, directly adjacent to the lt brachiocephalic vein, mildly heterogeneous and punctate calcifications with focal perifissural RML scarring, and multiple splenic hypodense lesions measuring up to 2.2 cm. Patient is s/p FNA of the mediastinal mass which was negative for malignant cells and mediastinum showed fibroadipose tissue and smooth muscle with foca pigmented macrophage and lymphoplasmacytic infiltrate. She underwent medical and neurology clearance and now presents for her planned Right VATS, RA resection of anterior mediastinal mass. 23 year old female, social infrequent smoker, with history of childhood asthma, seizures as an infant who initially presented with cough x 3 months not improved with antibiotic treatment with CXR on 7/12/18 showing 8.2cm right chest mass and CT Chest on 7/17/18 revealed 5.6x3.7x6.8cm right anterior mediastinal amorphous mass, directly adjacent to the left brachiocephalic vein, mildly heterogeneous and punctate calcifications with focal perifissural RML scarring, and multiple splenic hypodense lesions measuring up to 2.2 cm.  She underwent FNA of said mass as an outpatient which was negative for malignancy, was referred to Dr. Mendoza for surgical evaluation, completed the remainder of pre-surgical testing and was admitted to [facility] on 8/31/18 where she underwent R VATS robotic removal of anterior mediastinal mass.  She arrived extubated to PACU in stable condition with 1 chest tube in place, no air leak on suction.      Neurovascular: No delirium, pain well managed on current regimen  -C/w PRNs for Pain control  -Monitor neuro status    Respiratory: Saturates well on 3L NC.  History above, POD 0  -CT to remain on suction overnight  -Post-op CXR with no obvious ptx, repeat in AM  -Encourage IS 10x/hour while awake, Cough and deep breathing exercises  -Monitor respiratory status via SpO2  -Continue to wean NC as tolerated    Cardiovascular: No acute issues.  HR controlled, HD stable.   -Monitor HR/BP/Tele    GI: Nausea and vomiting post-op  -Zofran PRN, Reglan 10mg IV given x 1.  Continue to monitor.   -Prophylaxis: Pepcid  -C/w bowel regimen  -Remain NPO while nauseous, ADAT     /Renal:   -BUN/Cr: 12/0.71  -Trend Cr on AM labs  -Replete electrolytes as needed    ID: Afebrile, asymptomatic  -WCC: 12.6  -Complete periop abx  -Continue to monitor for SIRS/Sepsis syndrome while inpatient    Endo: No acute issues.   -A1C: 5.3  -TSH: 0.360    Heme:   -H/H: 10.3/31.6  -CBC, chem in AM  -DVT ppx: HSQ 5000 u q8h and SCDs    Disposition: Home when medically appropriate. No

## 2018-09-01 LAB
ANION GAP SERPL CALC-SCNC: 14 MMOL/L — SIGNIFICANT CHANGE UP (ref 5–17)
BUN SERPL-MCNC: 9 MG/DL — SIGNIFICANT CHANGE UP (ref 7–23)
CALCIUM SERPL-MCNC: 8.6 MG/DL — SIGNIFICANT CHANGE UP (ref 8.4–10.5)
CHLORIDE SERPL-SCNC: 99 MMOL/L — SIGNIFICANT CHANGE UP (ref 96–108)
CO2 SERPL-SCNC: 25 MMOL/L — SIGNIFICANT CHANGE UP (ref 22–31)
CREAT SERPL-MCNC: 0.63 MG/DL — SIGNIFICANT CHANGE UP (ref 0.5–1.3)
GLUCOSE SERPL-MCNC: 101 MG/DL — HIGH (ref 70–99)
HCG SERPL-ACNC: <.1 MIU/ML — SIGNIFICANT CHANGE UP
HCT VFR BLD CALC: 31.4 % — LOW (ref 34.5–45)
HGB BLD-MCNC: 10.4 G/DL — LOW (ref 11.5–15.5)
MAGNESIUM SERPL-MCNC: 1.9 MG/DL — SIGNIFICANT CHANGE UP (ref 1.6–2.6)
MCHC RBC-ENTMCNC: 28.2 PG — SIGNIFICANT CHANGE UP (ref 27–34)
MCHC RBC-ENTMCNC: 33.1 G/DL — SIGNIFICANT CHANGE UP (ref 32–36)
MCV RBC AUTO: 85.1 FL — SIGNIFICANT CHANGE UP (ref 80–100)
PLATELET # BLD AUTO: 215 K/UL — SIGNIFICANT CHANGE UP (ref 150–400)
POTASSIUM SERPL-MCNC: 3.7 MMOL/L — SIGNIFICANT CHANGE UP (ref 3.5–5.3)
POTASSIUM SERPL-SCNC: 3.7 MMOL/L — SIGNIFICANT CHANGE UP (ref 3.5–5.3)
RBC # BLD: 3.69 M/UL — LOW (ref 3.8–5.2)
RBC # FLD: 13.4 % — SIGNIFICANT CHANGE UP (ref 10.3–16.9)
SODIUM SERPL-SCNC: 138 MMOL/L — SIGNIFICANT CHANGE UP (ref 135–145)
WBC # BLD: 10.2 K/UL — SIGNIFICANT CHANGE UP (ref 3.8–10.5)
WBC # FLD AUTO: 10.2 K/UL — SIGNIFICANT CHANGE UP (ref 3.8–10.5)

## 2018-09-01 PROCEDURE — 71045 X-RAY EXAM CHEST 1 VIEW: CPT | Mod: 26

## 2018-09-01 RX ORDER — FENTANYL CITRATE 50 UG/ML
12.5 INJECTION INTRAVENOUS ONCE
Qty: 0 | Refills: 0 | Status: DISCONTINUED | OUTPATIENT
Start: 2018-09-01 | End: 2018-09-01

## 2018-09-01 RX ORDER — ACETAMINOPHEN 500 MG
1000 TABLET ORAL ONCE
Qty: 0 | Refills: 0 | Status: COMPLETED | OUTPATIENT
Start: 2018-09-01 | End: 2018-09-01

## 2018-09-01 RX ORDER — KETOROLAC TROMETHAMINE 30 MG/ML
30 SYRINGE (ML) INJECTION EVERY 6 HOURS
Qty: 0 | Refills: 0 | Status: DISCONTINUED | OUTPATIENT
Start: 2018-09-01 | End: 2018-09-03

## 2018-09-01 RX ORDER — POTASSIUM CHLORIDE 20 MEQ
40 PACKET (EA) ORAL ONCE
Qty: 0 | Refills: 0 | Status: COMPLETED | OUTPATIENT
Start: 2018-09-01 | End: 2018-09-01

## 2018-09-01 RX ORDER — FENTANYL CITRATE 50 UG/ML
12.5 INJECTION INTRAVENOUS
Qty: 0 | Refills: 0 | Status: DISCONTINUED | OUTPATIENT
Start: 2018-09-01 | End: 2018-09-02

## 2018-09-01 RX ORDER — MAGNESIUM OXIDE 400 MG ORAL TABLET 241.3 MG
800 TABLET ORAL ONCE
Qty: 0 | Refills: 0 | Status: COMPLETED | OUTPATIENT
Start: 2018-09-01 | End: 2018-09-01

## 2018-09-01 RX ADMIN — Medication 2000 MILLIGRAM(S): at 05:02

## 2018-09-01 RX ADMIN — FAMOTIDINE 20 MILLIGRAM(S): 10 INJECTION INTRAVENOUS at 05:00

## 2018-09-01 RX ADMIN — Medication 100 MILLIGRAM(S): at 21:09

## 2018-09-01 RX ADMIN — FENTANYL CITRATE 12.5 MICROGRAM(S): 50 INJECTION INTRAVENOUS at 21:08

## 2018-09-01 RX ADMIN — Medication 30 MILLIGRAM(S): at 11:04

## 2018-09-01 RX ADMIN — Medication 40 MILLIEQUIVALENT(S): at 09:07

## 2018-09-01 RX ADMIN — FENTANYL CITRATE 12.5 MICROGRAM(S): 50 INJECTION INTRAVENOUS at 13:33

## 2018-09-01 RX ADMIN — Medication 100 MILLIGRAM(S): at 13:15

## 2018-09-01 RX ADMIN — LIDOCAINE 1 PATCH: 4 CREAM TOPICAL at 21:07

## 2018-09-01 RX ADMIN — Medication 30 MILLIGRAM(S): at 11:01

## 2018-09-01 RX ADMIN — Medication 1000 MILLIGRAM(S): at 04:55

## 2018-09-01 RX ADMIN — HEPARIN SODIUM 5000 UNIT(S): 5000 INJECTION INTRAVENOUS; SUBCUTANEOUS at 13:15

## 2018-09-01 RX ADMIN — FENTANYL CITRATE 12.5 MICROGRAM(S): 50 INJECTION INTRAVENOUS at 18:56

## 2018-09-01 RX ADMIN — Medication 30 MILLIGRAM(S): at 05:00

## 2018-09-01 RX ADMIN — FENTANYL CITRATE 12.5 MICROGRAM(S): 50 INJECTION INTRAVENOUS at 21:38

## 2018-09-01 RX ADMIN — FENTANYL CITRATE 12.5 MICROGRAM(S): 50 INJECTION INTRAVENOUS at 18:43

## 2018-09-01 RX ADMIN — HEPARIN SODIUM 5000 UNIT(S): 5000 INJECTION INTRAVENOUS; SUBCUTANEOUS at 21:08

## 2018-09-01 RX ADMIN — ONDANSETRON 4 MILLIGRAM(S): 8 TABLET, FILM COATED ORAL at 05:00

## 2018-09-01 RX ADMIN — Medication 1000 MILLIGRAM(S): at 09:15

## 2018-09-01 RX ADMIN — FAMOTIDINE 20 MILLIGRAM(S): 10 INJECTION INTRAVENOUS at 17:58

## 2018-09-01 RX ADMIN — Medication 1000 MILLIGRAM(S): at 17:50

## 2018-09-01 RX ADMIN — LIDOCAINE 1 PATCH: 4 CREAM TOPICAL at 09:15

## 2018-09-01 RX ADMIN — Medication 30 MILLIGRAM(S): at 06:00

## 2018-09-01 RX ADMIN — HEPARIN SODIUM 5000 UNIT(S): 5000 INJECTION INTRAVENOUS; SUBCUTANEOUS at 05:03

## 2018-09-01 RX ADMIN — Medication 400 MILLIGRAM(S): at 03:55

## 2018-09-01 RX ADMIN — MAGNESIUM OXIDE 400 MG ORAL TABLET 800 MILLIGRAM(S): 241.3 TABLET ORAL at 09:07

## 2018-09-01 RX ADMIN — Medication 30 MILLIGRAM(S): at 18:04

## 2018-09-01 RX ADMIN — Medication 400 MILLIGRAM(S): at 09:07

## 2018-09-01 RX ADMIN — Medication 400 MILLIGRAM(S): at 15:37

## 2018-09-01 RX ADMIN — Medication 30 MILLIGRAM(S): at 18:09

## 2018-09-01 RX ADMIN — Medication 100 MILLIGRAM(S): at 05:02

## 2018-09-01 RX ADMIN — FENTANYL CITRATE 12.5 MICROGRAM(S): 50 INJECTION INTRAVENOUS at 14:12

## 2018-09-01 NOTE — PROGRESS NOTE ADULT - ASSESSMENT
23 year old female, social infrequent smoker, with history of childhood asthma, seizures as an infant who initially presented with cough x 3 months not improved with antibiotic treatment with CXR on 7/12/18 showing 8.2cm right chest mass and CT Chest on 7/17/18 revealed 5.6x3.7x6.8cm right anterior mediastinal amorphous mass. She underwent FNA as an outpatient which was negative for malignancy, was referred to Dr. Mendoza for surgical evaluation. Patient was admitted to [facility] on 8/31/18 where she underwent R VATS robotic removal of anterior mediastinal mass. She arrived extubated to PACU in stable condition with 1 chest tube in place, no air leak on suction. On POD1, chest tube was placed on water seal and repeat CXR show right pneumothorax, chest tube has since been placed back on suction. Patient stable.    Neurovascular: No delirium, pain well managed on current regimen  - Continue with IV tylenol PRN, lidocaine patch PRN for pain control.   - Avoiding narcotics 2/2 nausea/vomiting.     Respiratory: Saturating 98% on room air.  POD1 R VATS removal of anterior mediastinal mass.  - Failed water seal trial today, f/u CXR with pneumothorax, CT placed back on suction. F/u AM CXR. Patient stable, nursing staff aware and educated to stay vigilant for signs of resp distress.  -Encourage IS 10x/hour while awake, Cough and deep breathing exercises  -Monitor respiratory status via SpO2    Cardiovascular: No acute issues.  HR controlled, HD stable.   -Monitor HR/BP/Tele    GI: Nausea and vomiting post-op, now resolved. Tolerating PO.  -Zofran PRN. Holding narcotics.  -Prophylaxis: Pepcid  -C/w bowel regimen     /Renal: BUN/Cr: 9/0.63  -Replete electrolytes as needed    ID: Afebrile, asymptomatic  -WBC: 10.2  -Complete periop abx  -Continue to monitor for SIRS/Sepsis syndrome while inpatient    Endo: No PMHx of DM or thyroid disease.   -A1C: 5.3  -TSH: 0.360    Heme: H&H: 10.4/31.4  - lab holiday tomorrow    Prophylaxis:  -DVT ppx: continue with subQ heparin 5000U q8hrs  - SCDs    Disposition: Home pending chest tube management.

## 2018-09-01 NOTE — PROGRESS NOTE ADULT - SUBJECTIVE AND OBJECTIVE BOX
Patient discussed on morning rounds with Dr. Buenrostro (covering for Dr. Mendoza)    Operation / Date: 8/31/18 R VATS RA Removal of Anterior Mediastinal Mass    SUBJECTIVE ASSESSMENT:  23y Female seen and examined bedside, boyfriend at bedside. Overnight patient had nausea and vomiting, given zofran, narcotic pain meds discontinued, nausea has resolved today. Patient complaining of severe pain at the chest tube site, 10/10 in severity, given IV Tylenol which helped pain. Patient also states that walking helps the pain as well. Denies SOB, palpitations, hemopytsis, N/V/D today, abdominal pain, dizziness, fever or chills. Patient is ambulating, tolerating PO diet, and voiding spontaneously.         Vital Signs Last 24 Hrs  T(C): 36.3 (01 Sep 2018 10:23), Max: 97.2 (31 Aug 2018 12:20)  T(F): 97.4 (01 Sep 2018 10:23), Max: 99.1 (31 Aug 2018 22:03)  HR: 94 (01 Sep 2018 08:30) (88 - 102)  BP: 118/70 (01 Sep 2018 08:30) (107/58 - 119/75)  BP(mean): 89 (01 Sep 2018 08:30) (77 - 94)  RR: 17 (01 Sep 2018 08:30) (8 - 21)  SpO2: 98% (01 Sep 2018 08:30) (96% - 99%)  I&O's Detail    31 Aug 2018 07:01  -  01 Sep 2018 07:00  --------------------------------------------------------  IN:    lactated ringers.: 400 mL  Total IN: 400 mL    OUT:    Chest Tube: 172 mL    Voided: 1475 mL  Total OUT: 1647 mL    Total NET: -1247 mL      01 Sep 2018 07:01  -  01 Sep 2018 11:28  --------------------------------------------------------  IN:  Total IN: 0 mL    OUT:  Total OUT: 0 mL    Total NET: 0 mL          CHEST TUBE:  Yes. AIR LEAKS: Yes 1/5 air leak on Suction   ROSALIE DRAIN:  No.  EPICARDIAL WIRES: No.  TIE DOWNS: Yes.  BURGOS: No.    PHYSICAL EXAM:    General: Young female sitting comfortably in chair, boyfriend at bedside, tearful 2/2 pain this morning, now much resolved.     Neurological: Alert and oriented. No focal neurological deficits     Cardiovascular: S1S2, RRR, no murmurs appreciated on exam     Respiratory: Clear to ausculation bilaterally, no wheezes rales or rhonchi.     Gastrointestinal: Abdomen soft, non tender, non distended     Extremities: Warm and well perfused. No peripheral edema or calf tenderness     Vascular: Peripheral pulses 2+ b/l.    Incision Sites: R VATS: c/d/i, chest tube securely in place.     LABS:                        10.4   10.2  )-----------( 215      ( 01 Sep 2018 05:58 )             31.4       COUMADIN:  No.   PT/INR - ( 31 Aug 2018 13:27 )   PT: 12.3 sec;   INR: 1.11          PTT - ( 31 Aug 2018 13:27 )  PTT:27.4 sec    09-01    138  |  99  |  9   ----------------------------<  101<H>  3.7   |  25  |  0.63    Ca    8.6      01 Sep 2018 05:58  Mg     1.9     09-01            MEDICATIONS  (STANDING):  docusate sodium 100 milliGRAM(s) Oral three times a day  famotidine    Tablet 20 milliGRAM(s) Oral two times a day  heparin  Injectable 5000 Unit(s) SubCutaneous every 8 hours  lactated ringers. 1000 milliLiter(s) (50 mL/Hr) IV Continuous <Continuous>  lidocaine   Patch 1 Patch Transdermal daily  multiple electrolytes Injection Type 1 1000 milliLiter(s) (250 mL/Hr) IV Continuous <Continuous>    MEDICATIONS  (PRN):  ondansetron Injectable 4 milliGRAM(s) IV Push every 4 hours PRN Nausea and/or Vomiting        RADIOLOGY & ADDITIONAL TESTS:    9/1/18 CXR s/p water seal trial: Right pneumothorax, chest tube in place.

## 2018-09-02 ENCOUNTER — TRANSCRIPTION ENCOUNTER (OUTPATIENT)
Age: 23
End: 2018-09-02

## 2018-09-02 PROCEDURE — 71045 X-RAY EXAM CHEST 1 VIEW: CPT | Mod: 26,77

## 2018-09-02 PROCEDURE — 71045 X-RAY EXAM CHEST 1 VIEW: CPT | Mod: 26

## 2018-09-02 RX ORDER — ACETAMINOPHEN 500 MG
650 TABLET ORAL EVERY 6 HOURS
Qty: 0 | Refills: 0 | Status: DISCONTINUED | OUTPATIENT
Start: 2018-09-02 | End: 2018-09-03

## 2018-09-02 RX ORDER — SIMETHICONE 80 MG/1
80 TABLET, CHEWABLE ORAL ONCE
Qty: 0 | Refills: 0 | Status: COMPLETED | OUTPATIENT
Start: 2018-09-02 | End: 2018-09-02

## 2018-09-02 RX ORDER — FENTANYL CITRATE 50 UG/ML
12.5 INJECTION INTRAVENOUS ONCE
Qty: 0 | Refills: 0 | Status: DISCONTINUED | OUTPATIENT
Start: 2018-09-02 | End: 2018-09-02

## 2018-09-02 RX ORDER — ACETAMINOPHEN 500 MG
1000 TABLET ORAL ONCE
Qty: 0 | Refills: 0 | Status: COMPLETED | OUTPATIENT
Start: 2018-09-02 | End: 2018-09-02

## 2018-09-02 RX ORDER — TRAMADOL HYDROCHLORIDE 50 MG/1
25 TABLET ORAL EVERY 4 HOURS
Qty: 0 | Refills: 0 | Status: DISCONTINUED | OUTPATIENT
Start: 2018-09-02 | End: 2018-09-03

## 2018-09-02 RX ADMIN — Medication 30 MILLIGRAM(S): at 07:20

## 2018-09-02 RX ADMIN — FAMOTIDINE 20 MILLIGRAM(S): 10 INJECTION INTRAVENOUS at 05:30

## 2018-09-02 RX ADMIN — FENTANYL CITRATE 12.5 MICROGRAM(S): 50 INJECTION INTRAVENOUS at 01:30

## 2018-09-02 RX ADMIN — Medication 1000 MILLIGRAM(S): at 05:34

## 2018-09-02 RX ADMIN — FENTANYL CITRATE 12.5 MICROGRAM(S): 50 INJECTION INTRAVENOUS at 00:00

## 2018-09-02 RX ADMIN — HEPARIN SODIUM 5000 UNIT(S): 5000 INJECTION INTRAVENOUS; SUBCUTANEOUS at 21:30

## 2018-09-02 RX ADMIN — Medication 400 MILLIGRAM(S): at 05:34

## 2018-09-02 RX ADMIN — Medication 400 MILLIGRAM(S): at 11:16

## 2018-09-02 RX ADMIN — FAMOTIDINE 20 MILLIGRAM(S): 10 INJECTION INTRAVENOUS at 17:36

## 2018-09-02 RX ADMIN — Medication 30 MILLIGRAM(S): at 13:26

## 2018-09-02 RX ADMIN — FENTANYL CITRATE 12.5 MICROGRAM(S): 50 INJECTION INTRAVENOUS at 02:48

## 2018-09-02 RX ADMIN — Medication 30 MILLIGRAM(S): at 20:37

## 2018-09-02 RX ADMIN — Medication 100 MILLIGRAM(S): at 13:28

## 2018-09-02 RX ADMIN — FENTANYL CITRATE 12.5 MICROGRAM(S): 50 INJECTION INTRAVENOUS at 00:30

## 2018-09-02 RX ADMIN — Medication 30 MILLIGRAM(S): at 12:45

## 2018-09-02 RX ADMIN — LIDOCAINE 1 PATCH: 4 CREAM TOPICAL at 15:24

## 2018-09-02 RX ADMIN — Medication 100 MILLIGRAM(S): at 21:20

## 2018-09-02 RX ADMIN — SIMETHICONE 80 MILLIGRAM(S): 80 TABLET, CHEWABLE ORAL at 20:58

## 2018-09-02 RX ADMIN — Medication 30 MILLIGRAM(S): at 21:00

## 2018-09-02 RX ADMIN — FENTANYL CITRATE 12.5 MICROGRAM(S): 50 INJECTION INTRAVENOUS at 02:10

## 2018-09-02 RX ADMIN — Medication 100 MILLIGRAM(S): at 05:30

## 2018-09-02 RX ADMIN — Medication 30 MILLIGRAM(S): at 06:37

## 2018-09-02 RX ADMIN — HEPARIN SODIUM 5000 UNIT(S): 5000 INJECTION INTRAVENOUS; SUBCUTANEOUS at 13:28

## 2018-09-02 RX ADMIN — FENTANYL CITRATE 12.5 MICROGRAM(S): 50 INJECTION INTRAVENOUS at 01:15

## 2018-09-02 RX ADMIN — Medication 1000 MILLIGRAM(S): at 11:40

## 2018-09-02 RX ADMIN — HEPARIN SODIUM 5000 UNIT(S): 5000 INJECTION INTRAVENOUS; SUBCUTANEOUS at 05:29

## 2018-09-02 NOTE — DISCHARGE NOTE ADULT - HOSPITAL COURSE
24 y/o female with hx of social smoking( 1cigarette q6months x2 years), hx of childhood asthma, hx of seizures at 2 months old who initially presented with cough x3 months treated with Z saadia and Doxy with no improvement. She was sent for a CXR on 7/12/18 which showed a 8.2 cm convex opacity projecting over the Rt Hilar/suprahilar region. CT chest 7/17 revealed a 5.6 x 3.7 x 6.8 cm Right anterior mediastinal amorphous mass, directly adjacent to the lt brachiocephalic vein, mildly heterogeneous and punctate calcifications with focal perifissural RML scarring, and multiple splenic hypodense lesions measuring up to 2.2 cm. Patient is s/p FNA of the mediastinal mass which was negative for malignant cells and mediastinum showed fibroadipose tissue and smooth muscle with foca pigmented macrophage and lymphoplasmacytic infiltrate. She underwent medical and neurology clearance and now presents for her planned Right VATS, RA resection of anterior mediastinal mass. She was seen in [facility] and denies any acute complaints and is in her usual state of health. 22 y/o female with hx of social smoking( 1cigarette q6months x2 years), hx of childhood asthma, hx of seizures at 2 months old who initially presented with cough x3 months treated with Z saadia and Doxy with no improvement. She was sent for a CXR on 7/12/18 which showed a 8.2 cm convex opacity projecting over the Rt Hilar/suprahilar region. CT chest 7/17 revealed a 5.6 x 3.7 x 6.8 cm Right anterior mediastinal amorphous mass, directly adjacent to the lt brachiocephalic vein, mildly heterogeneous and punctate calcifications with focal perifissural RML scarring, and multiple splenic hypodense lesions measuring up to 2.2 cm. Patient is s/p FNA of the mediastinal mass which was negative for malignant cells and mediastinum showed fibroadipose tissue and smooth muscle with foca pigmented macrophage and lymphoplasmacytic infiltrate. She underwent medical and neurology clearance and presented for Right VATS, RA resection of anterior mediastinal mass on 8/31/18.  She had an uncomplicated operative course and was brought to PACU post-op extubated and HD stable with 1 right chest tube in place with no air leak.  Chest tube was placed to water seal the following day but she developed a PTX on water seal and chest tube was put back to suction.  Repeat water seal attempt the following day showed to interval PTX and chest tube was removed.  Repeat CXR showed a small apical PTX but it had resolved on repeat CXR.  Her pain has been well controlled since chest tube removed, she has been breathing comfortably on room air and ambulating in hallway without difficulty.  She has no other present concerns and is ready to return home.

## 2018-09-02 NOTE — DISCHARGE NOTE ADULT - REASON FOR ADMISSION
Pharmacy request  Health Maintenance   Topic Date Due    Hepatitis C screen  1949    Zostavax vaccine  04/25/2009    DEXA (modify frequency per FRAX score)  04/25/2014    Pneumococcal low/med risk (1 of 2 - PCV13) 04/25/2014    Flu vaccine (1) 09/01/2017    Breast cancer screen  02/01/2018    Lipid screen  06/25/2020    Colon cancer screen colonoscopy  09/25/2024    DTaP/Tdap/Td vaccine (3 - Td) 06/25/2025             (applicable per patient's age: Cancer Screenings, Depression Screening, Fall Risk Screening, Immunizations)    No results found for: LABA1C, LABMICR, LDLCHOLESTEROL, LDLCALC, AST, ALT, BUN   (goal A1C is < 7)   (goal LDL is <100) need 30-50% reduction from baseline     BP Readings from Last 3 Encounters:   11/09/16 122/78   09/30/16 140/80    (goal /80)      All Future Testing planned in [facility]:      Next Visit Date:  No future appointments. There is no problem list on file for this patient.
anterior mediastinal mass

## 2018-09-02 NOTE — DISCHARGE NOTE ADULT - PLAN OF CARE
see below -Please follow up with Dr. Mendoza in 1-2 weeks.  Please call the office on Tuesday to schedule your follow up appointment.  The office is located at [facility], [facility] 4th Floor.  Call us with any questions #[phone].  -No driving or strenuous activity for 6 weeks, or until cleared by your surgeon.  Avoid lifting >10lbs.   -Continue to walk daily as tolerated and use your incentive spirometer every hour.  -Gently clean your incision(s) with anti-bacterial soap and water, pat dry and leave open to air.  We recommend liquid Dial.    -Call your doctor if you have shortness of breath, chest pain not relieved by pain medication, dizziness, fever >101.5, or increased redness/drainage from your incision.

## 2018-09-02 NOTE — DISCHARGE NOTE ADULT - CARE PROVIDER_API CALL
Jack Mendoza), Surgery; Thoracic Surgery  [id number] [address] [place] [address]  [place] [address]  Phone: [phone]  Fax: [phone]

## 2018-09-02 NOTE — DISCHARGE NOTE ADULT - CARE PLAN
Goal:	see below  Assessment and plan of treatment:	-Please follow up with Dr. Mendoza in 1-2 weeks.  Please call the office on Tuesday to schedule your follow up appointment.  The office is located at [facility], [facility] 4th Floor.  Call us with any questions #[phone].  -No driving or strenuous activity for 6 weeks, or until cleared by your surgeon.  Avoid lifting >10lbs.   -Continue to walk daily as tolerated and use your incentive spirometer every hour.  -Gently clean your incision(s) with anti-bacterial soap and water, pat dry and leave open to air.  We recommend liquid Dial.    -Call your doctor if you have shortness of breath, chest pain not relieved by pain medication, dizziness, fever >101.5, or increased redness/drainage from your incision.

## 2018-09-02 NOTE — PROGRESS NOTE ADULT - ASSESSMENT
23 year old female, social infrequent smoker, with history of childhood asthma, seizures as an infant who initially presented with cough x 3 months not improved with antibiotic treatment with CXR on 7/12/18 showing 8.2cm right chest mass and CT Chest on 7/17/18 revealed 5.6x3.7x6.8cm right anterior mediastinal amorphous mass. She underwent FNA as an outpatient which was negative for malignancy, was referred to Dr. Mendoza for surgical evaluation. Patient was admitted to [facility] on 8/31/18 where she underwent R VATS robotic removal of anterior mediastinal mass. She arrived extubated to PACU in stable condition with 1 chest tube in place, no air leak on suction. On POD1, chest tube was placed on water seal and repeat CXR show right pneumothorax, chest tube has since been placed back on suction. POD2 attempted water seal trial again, repeat CXR stable and chest tube removed    Neurovascular: No delirium, pain well managed on current regimen  - Continue with IV tylenol and toradol PRN, lidocaine patch PRN for pain control.   - Avoiding narcotics 2/2 nausea/vomiting.   - will transition to PO pain rx now that tube is out    Respiratory: Saturating 98% on room air.  POD2 R VATS removal of anterior mediastinal mass.  - Chest tube removed, f/u repeat CXR  -Encourage IS 10x/hour while awake, Cough and deep breathing exercises  -Monitor respiratory status via SpO2    Cardiovascular: No acute issues.  HR controlled, HD stable.   -Monitor HR/BP/Tele    GI: Nausea and vomiting post-op, now resolved. Tolerating PO.  -Zofran PRN. Holding narcotics.  -Prophylaxis: Pepcid  -C/w bowel regimen     /Renal: BUN/Cr: 9/0.63  -Replete electrolytes as needed    ID: Afebrile, asymptomatic  -WBC: 10.2  -Complete periop abx  -Continue to monitor for SIRS/Sepsis syndrome while inpatient    Endo: No PMHx of DM or thyroid disease.   -A1C: 5.3  -TSH: 0.360    Heme: H&H: 10.4/31.4  - lab holiday tomorrow    Prophylaxis:  -DVT ppx: continue with subQ heparin 5000U q8hrs  - SCDs    Disposition: Home tomrrow

## 2018-09-02 NOTE — DISCHARGE NOTE ADULT - MEDICATION SUMMARY - MEDICATIONS TO TAKE
I will START or STAY ON the medications listed below when I get home from the hospital:    acetaminophen 325 mg oral tablet  -- 2 tab(s) by mouth every 6 hours, As needed, Mild Pain (1 - 3)  -- Indication: For pain    ibuprofen 600 mg oral tablet  -- 1 tab(s) by mouth every 6 hours, As Needed -for moderate pain MDD:4  -- Do not take this drug if you are pregnant.  It is very important that you take or use this exactly as directed.  Do not skip doses or discontinue unless directed by your doctor.  May cause drowsiness or dizziness.  Obtain medical advice before taking any non-prescription drugs as some may affect the action of this medication.  Take with food or milk.    -- Indication: For pain    traMADol 50 mg oral tablet  -- 0.5 tab(s) by mouth every 4 hours, As needed, Severe Pain (7 - 10) MDD:6  -- Indication: For pain    lidocaine 5% topical film  -- Apply on skin to affected area once a day  -- Indication: For pain    docusate sodium 100 mg oral capsule  -- 1 cap(s) by mouth 3 times a day  -- Indication: For Constipation    Multiple Vitamins oral tablet  -- 1 tab(s) by mouth once a day  -- Indication: For vitamin

## 2018-09-02 NOTE — DISCHARGE NOTE ADULT - PATIENT PORTAL LINK FT
You can access the [facility] Patient Portal, offered by [facility], by registering with the following website: [email or web address]

## 2018-09-02 NOTE — PROGRESS NOTE ADULT - SUBJECTIVE AND OBJECTIVE BOX
Patient discussed on morning rounds with Dr. Ortiz     Operation / Date: 8/31/18 R VATS resection of mediastinal mass    SUBJECTIVE ASSESSMENT:    Pt reports pain limiting her movement and deep breathing.  She denies dyspnea/SOB, fevers/chills.  She is breathing comfortably on room air.    Vital Signs Last 24 Hrs  T(C): 36.3 (02 Sep 2018 13:55), Max: 37.3 (02 Sep 2018 06:05)  T(F): 97.4 (02 Sep 2018 13:55), Max: 99.2 (02 Sep 2018 06:05)  HR: 84 (02 Sep 2018 12:04) (80 - 96)  BP: 125/88 (02 Sep 2018 12:04) (117/70 - 135/86)  BP(mean): 102 (02 Sep 2018 12:04) (89 - 105)  RR: 16 (02 Sep 2018 12:04) (16 - 18)  SpO2: 98% (02 Sep 2018 12:04) (96% - 100%)  I&O's Detail    01 Sep 2018 07:01  -  02 Sep 2018 07:00  --------------------------------------------------------  IN:    IV PiggyBack: 300 mL    lactated ringers.: 600 mL    Oral Fluid: 100 mL  Total IN: 1000 mL    OUT:    Chest Tube: 30 mL    Voided: 1450 mL  Total OUT: 1480 mL    Total NET: -480 mL      02 Sep 2018 07:01  -  02 Sep 2018 15:25  --------------------------------------------------------  IN:    IV PiggyBack: 100 mL  Total IN: 100 mL    OUT:    Voided: 200 mL  Total OUT: 200 mL    Total NET: -100 mL    CHEST TUBE:  Yes. AIR LEAKS: Yes/No. Suction / H2O SEAL.   ROSALIE DRAIN:  Yes/No.  EPICARDIAL WIRES: Yes/No.  TIE DOWNS: Yes/No.  BURGOS: Yes/No.    PHYSICAL EXAM:    General:     Neurological:    Cardiovascular:    Respiratory:    Gastrointestinal:    Extremities:    Vascular:    Incision Sites:    LABS:                        10.4   10.2  )-----------( 215      ( 01 Sep 2018 05:58 )             31.4       COUMADIN:  Yes/No. REASON: .        09-01    138  |  99  |  9   ----------------------------<  101<H>  3.7   |  25  |  0.63    Ca    8.6      01 Sep 2018 05:58  Mg     1.9     09-01            MEDICATIONS  (STANDING):  docusate sodium 100 milliGRAM(s) Oral three times a day  famotidine    Tablet 20 milliGRAM(s) Oral two times a day  heparin  Injectable 5000 Unit(s) SubCutaneous every 8 hours  lidocaine   Patch 1 Patch Transdermal daily  multiple electrolytes Injection Type 1 1000 milliLiter(s) (250 mL/Hr) IV Continuous <Continuous>    MEDICATIONS  (PRN):  ketorolac   Injectable 30 milliGRAM(s) IV Push every 6 hours PRN Moderate Pain (4 - 6)  ondansetron Injectable 4 milliGRAM(s) IV Push every 4 hours PRN Nausea and/or Vomiting        RADIOLOGY & ADDITIONAL TESTS: Patient discussed on morning rounds with Dr. Ortiz     Operation / Date: 8/31/18 R VATS resection of mediastinal mass    SUBJECTIVE ASSESSMENT:    Pt reports pain limiting her movement and deep breathing.  She denies dyspnea/SOB, fevers/chills.  She is breathing comfortably on room air.    Vital Signs Last 24 Hrs  T(C): 36.3 (02 Sep 2018 13:55), Max: 37.3 (02 Sep 2018 06:05)  T(F): 97.4 (02 Sep 2018 13:55), Max: 99.2 (02 Sep 2018 06:05)  HR: 84 (02 Sep 2018 12:04) (80 - 96)  BP: 125/88 (02 Sep 2018 12:04) (117/70 - 135/86)  BP(mean): 102 (02 Sep 2018 12:04) (89 - 105)  RR: 16 (02 Sep 2018 12:04) (16 - 18)  SpO2: 98% (02 Sep 2018 12:04) (96% - 100%)  I&O's Detail    01 Sep 2018 07:01  -  02 Sep 2018 07:00  --------------------------------------------------------  IN:    IV PiggyBack: 300 mL    lactated ringers.: 600 mL    Oral Fluid: 100 mL  Total IN: 1000 mL    OUT:    Chest Tube: 30 mL    Voided: 1450 mL  Total OUT: 1480 mL    Total NET: -480 mL      02 Sep 2018 07:01  -  02 Sep 2018 15:25  --------------------------------------------------------  IN:    IV PiggyBack: 100 mL  Total IN: 100 mL    OUT:    Voided: 200 mL  Total OUT: 200 mL    Total NET: -100 mL    CHEST TUBE:  Removed in afternoon   BURGOS: No.    PHYSICAL EXAM:    General: NAD     Neurological: A&Ox3    Cardiovascular: S1S2 RRR    Respiratory: CTA b/l no W/R/R    Gastrointestinal: soft NT/ND +BS    Extremities: warm, no edema    Vascular: warm and well perfused bilaterally    Incision Sites: R VATS incisions C/D/I    LABS:                        10.4   10.2  )-----------( 215      ( 01 Sep 2018 05:58 )             31.4       COUMADIN:  No.       09-01    138  |  99  |  9   ----------------------------<  101<H>  3.7   |  25  |  0.63    Ca    8.6      01 Sep 2018 05:58  Mg     1.9     09-01            MEDICATIONS  (STANDING):  docusate sodium 100 milliGRAM(s) Oral three times a day  famotidine    Tablet 20 milliGRAM(s) Oral two times a day  heparin  Injectable 5000 Unit(s) SubCutaneous every 8 hours  lidocaine   Patch 1 Patch Transdermal daily  multiple electrolytes Injection Type 1 1000 milliLiter(s) (250 mL/Hr) IV Continuous <Continuous>    MEDICATIONS  (PRN):  ketorolac   Injectable 30 milliGRAM(s) IV Push every 6 hours PRN Moderate Pain (4 - 6)  ondansetron Injectable 4 milliGRAM(s) IV Push every 4 hours PRN Nausea and/or Vomiting        RADIOLOGY & ADDITIONAL TESTS:    CXR: no effusions/PTX

## 2018-09-03 VITALS — HEART RATE: 74 BPM | DIASTOLIC BLOOD PRESSURE: 63 MMHG | RESPIRATION RATE: 15 BRPM | SYSTOLIC BLOOD PRESSURE: 102 MMHG

## 2018-09-03 PROCEDURE — 71045 X-RAY EXAM CHEST 1 VIEW: CPT | Mod: 26

## 2018-09-03 RX ORDER — ACETAMINOPHEN 500 MG
2 TABLET ORAL
Qty: 60 | Refills: 0 | OUTPATIENT
Start: 2018-09-03

## 2018-09-03 RX ORDER — TRAMADOL HYDROCHLORIDE 50 MG/1
0.5 TABLET ORAL
Qty: 20 | Refills: 0 | OUTPATIENT
Start: 2018-09-03

## 2018-09-03 RX ORDER — LIDOCAINE 4 G/100G
1 CREAM TOPICAL
Qty: 0 | Refills: 0 | COMMUNITY
Start: 2018-09-03

## 2018-09-03 RX ORDER — DOCUSATE SODIUM 100 MG
1 CAPSULE ORAL
Qty: 30 | Refills: 0 | OUTPATIENT
Start: 2018-09-03 | End: 2018-09-12

## 2018-09-03 RX ORDER — IBUPROFEN 200 MG
1 TABLET ORAL
Qty: 60 | Refills: 0 | OUTPATIENT
Start: 2018-09-03

## 2018-09-03 RX ADMIN — FAMOTIDINE 20 MILLIGRAM(S): 10 INJECTION INTRAVENOUS at 06:14

## 2018-09-03 RX ADMIN — Medication 650 MILLIGRAM(S): at 06:19

## 2018-09-03 RX ADMIN — HEPARIN SODIUM 5000 UNIT(S): 5000 INJECTION INTRAVENOUS; SUBCUTANEOUS at 06:14

## 2018-09-03 RX ADMIN — Medication 100 MILLIGRAM(S): at 06:14

## 2018-09-03 RX ADMIN — LIDOCAINE 1 PATCH: 4 CREAM TOPICAL at 11:05

## 2018-09-03 RX ADMIN — Medication 650 MILLIGRAM(S): at 07:10

## 2018-09-03 RX ADMIN — LIDOCAINE 1 PATCH: 4 CREAM TOPICAL at 03:00

## 2018-09-03 NOTE — PROGRESS NOTE ADULT - SUBJECTIVE AND OBJECTIVE BOX
Patient discussed on morning rounds with Dr. Ortiz     Operation / Date: 8/31/18 R VATS RA removal of anterior mediastinal mass    Surgeon: Dr. Mendoza    SUBJECTIVE ASSESSMENT:    Pt reports pain well controlled, denies dyspnea/SOB, fevers/chills.  She is ambulating in hallway and breathing comfortably on room air.    Hospital Course:    24 y/o female with hx of social smoking( 1cigarette q6months x2 years), hx of childhood asthma, hx of seizures at 2 months old who initially presented with cough x3 months treated with Z saadia and Doxy with no improvement. She was sent for a CXR on 7/12/18 which showed a 8.2 cm convex opacity projecting over the Rt Hilar/suprahilar region. CT chest 7/17 revealed a 5.6 x 3.7 x 6.8 cm Right anterior mediastinal amorphous mass, directly adjacent to the lt brachiocephalic vein, mildly heterogeneous and punctate calcifications with focal perifissural RML scarring, and multiple splenic hypodense lesions measuring up to 2.2 cm. Patient is s/p FNA of the mediastinal mass which was negative for malignant cells and mediastinum showed fibroadipose tissue and smooth muscle with foca pigmented macrophage and lymphoplasmacytic infiltrate. She underwent medical and neurology clearance and presented for Right VATS, RA resection of anterior mediastinal mass on 8/31/18.  She had an uncomplicated operative course and was brought to PACU post-op extubated and HD stable with 1 right chest tube in place with no air leak.  Chest tube was placed to water seal the following day but she developed a PTX on water seal and chest tube was put back to suction.  Repeat water seal attempt the following day showed to interval PTX and chest tube was removed.  Repeat CXR showed a small apical PTX but it had resolved on repeat CXR.  Her pain has been well controlled since chest tube removed, she has been breathing comfortably on room air and ambulating in hallway without difficulty.  She has no other present concerns and is ready to return home.     Vital Signs Last 24 Hrs  T(C): 36.4 (03 Sep 2018 09:44), Max: 37.8 (02 Sep 2018 22:10)  T(F): 97.6 (03 Sep 2018 09:44), Max: 100 (02 Sep 2018 22:10)  HR: 74 (03 Sep 2018 11:09) (72 - 84)  BP: 102/63 (03 Sep 2018 11:09) (97/54 - 125/88)  BP(mean): 77 (03 Sep 2018 11:09) (70 - 102)  RR: 15 (03 Sep 2018 11:09) (13 - 20)  SpO2: 98% (03 Sep 2018 08:43) (96% - 98%)  I&O's Detail    02 Sep 2018 07:01  -  03 Sep 2018 07:00  --------------------------------------------------------  IN:    IV PiggyBack: 100 mL    Oral Fluid: 120 mL  Total IN: 220 mL    OUT:    Voided: 850 mL  Total OUT: 850 mL    Total NET: -630 mL    TIE DOWNS REMOVED: No.    PHYSICAL EXAM:    General: NAD    Neurological: A&Ox3    Cardiovascular: S1S2 RRR    Respiratory: CTA b/l no W/R/R    Gastrointestinal: soft NT/ND +BS    Extremities: no edema    Vascular: warm and well perfused bilaterally    Incision Sites: R VATS incisions C/D/I    LABS:        MEDICATIONS  (STANDING):  docusate sodium 100 milliGRAM(s) Oral three times a day  famotidine    Tablet 20 milliGRAM(s) Oral two times a day  heparin  Injectable 5000 Unit(s) SubCutaneous every 8 hours  lidocaine   Patch 1 Patch Transdermal daily  multiple electrolytes Injection Type 1 1000 milliLiter(s) (250 mL/Hr) IV Continuous <Continuous>      Discharge CXR: no effusions/infiltrates

## 2018-09-04 PROBLEM — J45.909 UNSPECIFIED ASTHMA, UNCOMPLICATED: Chronic | Status: ACTIVE | Noted: 2018-08-30

## 2018-09-04 PROBLEM — R56.9 UNSPECIFIED CONVULSIONS: Chronic | Status: ACTIVE | Noted: 2018-08-31

## 2018-09-05 PROCEDURE — 85025 COMPLETE CBC W/AUTO DIFF WBC: CPT

## 2018-09-05 PROCEDURE — 86923 COMPATIBILITY TEST ELECTRIC: CPT

## 2018-09-05 PROCEDURE — P9045: CPT

## 2018-09-05 PROCEDURE — 85610 PROTHROMBIN TIME: CPT

## 2018-09-05 PROCEDURE — S2900: CPT

## 2018-09-05 PROCEDURE — 71045 X-RAY EXAM CHEST 1 VIEW: CPT

## 2018-09-05 PROCEDURE — 86901 BLOOD TYPING SEROLOGIC RH(D): CPT

## 2018-09-05 PROCEDURE — 86850 RBC ANTIBODY SCREEN: CPT

## 2018-09-05 PROCEDURE — 36415 COLL VENOUS BLD VENIPUNCTURE: CPT

## 2018-09-05 PROCEDURE — 80048 BASIC METABOLIC PNL TOTAL CA: CPT

## 2018-09-05 PROCEDURE — 85730 THROMBOPLASTIN TIME PARTIAL: CPT

## 2018-09-05 PROCEDURE — 83735 ASSAY OF MAGNESIUM: CPT

## 2018-09-05 PROCEDURE — 86900 BLOOD TYPING SEROLOGIC ABO: CPT

## 2018-09-05 PROCEDURE — 85027 COMPLETE CBC AUTOMATED: CPT

## 2018-09-05 PROCEDURE — 84702 CHORIONIC GONADOTROPIN TEST: CPT

## 2018-09-05 PROCEDURE — 88307 TISSUE EXAM BY PATHOLOGIST: CPT

## 2018-09-06 DIAGNOSIS — J98.59 OTHER DISEASES OF MEDIASTINUM, NOT ELSEWHERE CLASSIFIED: ICD-10-CM

## 2018-09-06 DIAGNOSIS — F17.210 NICOTINE DEPENDENCE, CIGARETTES, UNCOMPLICATED: ICD-10-CM

## 2018-09-06 DIAGNOSIS — J93.9 PNEUMOTHORAX, UNSPECIFIED: ICD-10-CM

## 2018-09-06 DIAGNOSIS — J45.909 UNSPECIFIED ASTHMA, UNCOMPLICATED: ICD-10-CM

## 2018-09-06 LAB — SURGICAL PATHOLOGY STUDY: SIGNIFICANT CHANGE UP

## 2018-09-13 ENCOUNTER — FORM ENCOUNTER (OUTPATIENT)
Age: 23
End: 2018-09-13

## 2018-09-14 ENCOUNTER — OUTPATIENT (OUTPATIENT)
Dept: OUTPATIENT SERVICES | Facility: HOSPITAL | Age: 23
LOS: 1 days | End: 2018-09-14
Payer: COMMERCIAL

## 2018-09-14 ENCOUNTER — APPOINTMENT (OUTPATIENT)
Dept: THORACIC SURGERY | Facility: CLINIC | Age: 23
End: 2018-09-14
Payer: COMMERCIAL

## 2018-09-14 VITALS
HEIGHT: 62 IN | DIASTOLIC BLOOD PRESSURE: 59 MMHG | RESPIRATION RATE: 18 BRPM | HEART RATE: 77 BPM | TEMPERATURE: 97.3 F | BODY MASS INDEX: 23.55 KG/M2 | WEIGHT: 128 LBS | SYSTOLIC BLOOD PRESSURE: 96 MMHG | OXYGEN SATURATION: 97 %

## 2018-09-14 DIAGNOSIS — Z09 ENCOUNTER FOR FOLLOW-UP EXAMINATION AFTER COMPLETED TREATMENT FOR CONDITIONS OTHER THAN MALIGNANT NEOPLASM: ICD-10-CM

## 2018-09-14 DIAGNOSIS — J98.59 OTHER DISEASES OF MEDIASTINUM, NOT ELSEWHERE CLASSIFIED: ICD-10-CM

## 2018-09-14 DIAGNOSIS — Z87.448 PERSONAL HISTORY OF OTHER DISEASES OF URINARY SYSTEM: Chronic | ICD-10-CM

## 2018-09-14 PROCEDURE — 71046 X-RAY EXAM CHEST 2 VIEWS: CPT

## 2018-09-14 PROCEDURE — 71046 X-RAY EXAM CHEST 2 VIEWS: CPT | Mod: 26

## 2018-09-14 PROCEDURE — 99024 POSTOP FOLLOW-UP VISIT: CPT

## 2018-09-14 RX ORDER — DOCUSATE SODIUM 100 MG/1
100 CAPSULE ORAL 3 TIMES DAILY
Qty: 60 | Refills: 1 | Status: ACTIVE | COMMUNITY

## 2018-09-14 RX ORDER — FAMOTIDINE 20 MG/1
20 TABLET, FILM COATED ORAL
Refills: 0 | Status: ACTIVE | COMMUNITY

## 2019-03-14 ENCOUNTER — FORM ENCOUNTER (OUTPATIENT)
Age: 24
End: 2019-03-14

## 2019-03-15 ENCOUNTER — OUTPATIENT (OUTPATIENT)
Dept: OUTPATIENT SERVICES | Facility: HOSPITAL | Age: 24
LOS: 1 days | End: 2019-03-15
Payer: COMMERCIAL

## 2019-03-15 ENCOUNTER — APPOINTMENT (OUTPATIENT)
Dept: THORACIC SURGERY | Facility: CLINIC | Age: 24
End: 2019-03-15
Payer: COMMERCIAL

## 2019-03-15 VITALS
WEIGHT: 126 LBS | DIASTOLIC BLOOD PRESSURE: 76 MMHG | HEART RATE: 80 BPM | TEMPERATURE: 98.4 F | SYSTOLIC BLOOD PRESSURE: 115 MMHG | RESPIRATION RATE: 19 BRPM | OXYGEN SATURATION: 97 %

## 2019-03-15 DIAGNOSIS — Z87.448 PERSONAL HISTORY OF OTHER DISEASES OF URINARY SYSTEM: Chronic | ICD-10-CM

## 2019-03-15 PROCEDURE — 99213 OFFICE O/P EST LOW 20 MIN: CPT

## 2019-03-15 PROCEDURE — 71046 X-RAY EXAM CHEST 2 VIEWS: CPT | Mod: 26

## 2019-03-15 PROCEDURE — 71046 X-RAY EXAM CHEST 2 VIEWS: CPT

## 2019-03-15 NOTE — PHYSICAL EXAM
[Sclera] : the sclera and conjunctiva were normal [PERRL With Normal Accommodation] : pupils were equal in size, round, and reactive to light [Extraocular Movements] : extraocular movements were intact [] : no respiratory distress [Respiration, Rhythm And Depth] : normal respiratory rhythm and effort [Exaggerated Use Of Accessory Muscles For Inspiration] : no accessory muscle use [Auscultation Breath Sounds / Voice Sounds] : lungs were clear to auscultation bilaterally [Apical Impulse] : the apical impulse was normal [Heart Rate And Rhythm] : heart rate was normal and rhythm regular [Heart Sounds] : normal S1 and S2 [Examination Of The Chest] : the chest was normal in appearance [2+] : left 2+ [Abdomen Soft] : soft [Abdomen Tenderness] : non-tender [Oriented To Time, Place, And Person] : oriented to person, place, and time [Impaired Insight] : insight and judgment were intact [Affect] : the affect was normal

## 2019-03-16 NOTE — CONSULT LETTER
[FreeTextEntry2] : ASHWINI RIVAS MD (REF/PCP) [FreeTextEntry3] : Jack Mendoza MD\par Professor, Cardiovascular & Thoracic Surgery\par [facility]\par Director of the [facility] \par Director of Thoracic Surgery, [facility]\par [facility]\par [address]\par [facility] 4th Floor\par [place] [address]\par Phone: [phone]\par Fax: [phone]

## 2019-03-16 NOTE — ASSESSMENT
[FreeTextEntry1] : \par 23 year old female, social smoker (1 cigarette every 6 months x 2 years),  pmhx of childhood asthma, who presented to Dr. Viki Yee c/o cough x 3 months, treated with Z-saadia and Doxy but with no improvement, sent for CXR on 7/12/18, which showed a 8.2cm convex opacity projecting over the Rt hilar/suprahilar region.\par \par CT Chest on 7/17/18:\par - 5.6 x 3.7 x 6.8cm Rt anterior mediastinal amorphous mass, directly adjacent to the lt brachiocephalic vein, mildly heterogeneous and punctate calcifications \par - focal perifissural RML scarring\par - multiple splenic hypodense lesions measuring up to 2.2cm \par \par FNA of the Mediastinal Mass 7/30/18:\par - Negative for malignant cells. 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 \par \par Today, patient presents for 6 month follow-up visit. At last visit, patient was referred to oncologist Dr. Brian Fall to r/o lymphoma. Per patient, no oncologic intervention needed at this time. She is doing well and remains asymptomatic. Denies SOB, cough, hemoptysis, chest discomfort, fever/chills. \par \par I have reviewed the patient's medical records and diagnostic images at the time of this office consultation and have made the following recommendation.\par Plan:\par 1. Request CT neck/chest ordered by PCP\par 2. Follow-up with us q1year if normal CT neck

## 2019-03-16 NOTE — HISTORY OF PRESENT ILLNESS
[FreeTextEntry1] : \par 23 year old female, social smoker (1 cigarette every 6 months x 2 years),  pmhx of childhood asthma, who presented to Dr. Viki Yee c/o cough x 3 months, treated with Z-saadia and Doxy but with no improvement, sent for CXR on 7/12/18, which showed a 8.2cm convex opacity projecting over the Rt hilar/suprahilar region.\par \par CT Chest on 7/17/18:\par - 5.6 x 3.7 x 6.8cm Rt anterior mediastinal amorphous mass, directly adjacent to the lt brachiocephalic vein, mildly heterogeneous and punctate calcifications \par - focal perifissural RML scarring\par - multiple splenic hypodense lesions measuring up to 2.2cm \par \par FNA of the Mediastinal Mass 7/30/18:\par - Negative for malignant cells. 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 \par \par Today, patient presents for 6 month follow-up visit. At last visit, patient was referred to oncologist Dr. Brian Fall to r/o lymphoma. Per patient, no oncologic intervention needed at this time. She is doing well and remains asymptomatic. Denies SOB, cough, hemoptysis, chest discomfort, fever/chills.

## 2020-10-05 DIAGNOSIS — D73.89 OTHER DISEASES OF SPLEEN: ICD-10-CM

## 2020-10-16 ENCOUNTER — APPOINTMENT (OUTPATIENT)
Dept: THORACIC SURGERY | Facility: CLINIC | Age: 25
End: 2020-10-16
Payer: COMMERCIAL

## 2020-10-16 VITALS
HEART RATE: 61 BPM | DIASTOLIC BLOOD PRESSURE: 68 MMHG | OXYGEN SATURATION: 100 % | HEIGHT: 62 IN | TEMPERATURE: 97.4 F | RESPIRATION RATE: 19 BRPM | SYSTOLIC BLOOD PRESSURE: 112 MMHG | WEIGHT: 112 LBS | BODY MASS INDEX: 20.61 KG/M2

## 2020-10-16 DIAGNOSIS — D18.1 LYMPHANGIOMA, ANY SITE: ICD-10-CM

## 2020-10-16 PROCEDURE — 99213 OFFICE O/P EST LOW 20 MIN: CPT

## 2020-10-16 NOTE — PHYSICAL EXAM
[] : no respiratory distress [Respiration, Rhythm And Depth] : normal respiratory rhythm and effort [Exaggerated Use Of Accessory Muscles For Inspiration] : no accessory muscle use [Auscultation Breath Sounds / Voice Sounds] : lungs were clear to auscultation bilaterally [Apical Impulse] : the apical impulse was normal [Heart Sounds] : normal S1 and S2 [Examination Of The Chest] : the chest was normal in appearance [2+] : left 2+ [Abnormal Walk] : normal gait [Musculoskeletal - Swelling] : no joint swelling seen [No Focal Deficits] : no focal deficits [Oriented To Time, Place, And Person] : oriented to person, place, and time

## 2020-10-19 NOTE — ASSESSMENT
[FreeTextEntry1] : \par \par 25 year old female, pmhx of childhood asthma, who presented to Dr. Viki Yee c/o cough x 3 months, treated with Z-saadia and Doxy but with no improvement, sent for CXR on 7/12/18, which showed a 8.2cm convex opacity projecting over the right hilar/suprahilar region. She is s/p Right VATS, robotic assist, excision of anterior mediastinal mass, total thymectomy on 8/31/18. 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 Patient was referred to oncologist Dr. Brian Fall to r/o lymphoma. Per patient, no oncologic intervention needed. She is doing well and remains asymptomatic. \par \par FNA of the Mediastinal Mass 7/30/18:\par - Negative for malignant cells. Mediastinum biopsy: fibroadipose tissue and smooth muscle with focal pigmented macrophage and lymphoplasmacytic infiltrate.\par \par CT Chest Abdomen Pelvis with contrast 10/14/20\par - Previously noted anterior mediastinal mass is no longer visualized\par - Multiple splenic lesions, similar to MRI abdomen 7/19/18. Differentials: lymphoma, cysts, hemangiomas\par \par CT Neck 10/15/20\par - small low-attenuation cystic lesion center in the right vallecula.\par - reticulation of the right infrahyoid neck soft tissue planes with subcentimeter right level III and right level V nodules johann stations. Similar to PET 7/24/18\par \par She denies any complaints. No further thoracic intervention needed. She may follow-up with us as needed. \par \par I have reviewed the patient's medical records and diagnostic images at the time of this office consultation and have made the following recommendation.\par Plan:\par 1. Follow-up as needed\par 2. Follow-up with Dr Brian Fall for splenic lesions, lymphoma surveillance

## 2020-10-19 NOTE — HISTORY OF PRESENT ILLNESS
[FreeTextEntry1] : \par 25 year old female, pmhx of childhood asthma, who presented to Dr. Viki Yee c/o cough x 3 months, treated with Z-saadia and Doxy but with no improvement, sent for CXR on 7/12/18, which showed a 8.2cm convex opacity projecting over the right hilar/suprahilar region. She is s/p Right VATS, robotic assist, excision of anterior mediastinal mass, total thymectomy on 8/31/18. 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 Patient was referred to oncologist Dr. Brian Fall to r/o lymphoma. Per patient, no oncologic intervention needed. She is doing well and remains asymptomatic. \par \par FNA of the Mediastinal Mass 7/30/18:\par - Negative for malignant cells. Mediastinum biopsy: fibroadipose tissue and smooth muscle with focal pigmented macrophage and lymphoplasmacytic infiltrate.\par \par CT Chest Abdomen Pelvis with contrast 10/14/20\par - Previously noted anterior mediastinal mass is no longer visualized\par - Multiple splenic lesions, similar to MRI abdomen 7/19/18. Differentials: lymphoma, cysts, hemangiomas\par \par CT Neck 10/15/20\par - small low-attenuation cystic lesion center in the right vallecula.\par - reticulation of the right infrahyoid neck soft tissue planes with subcentimeter right level III and right level V nodules johann stations. Similar to PET 7/24/18

## 2020-10-19 NOTE — CONSULT LETTER
[Dear  ___] : Dear  [unfilled], [Consult Letter:] : I had the pleasure of evaluating your patient, [unfilled]. [Consult Closing:] : Thank you very much for allowing me to participate in the care of this patient.  If you have any questions, please do not hesitate to contact me. [Please see my note below.] : Please see my note below. [Sincerely,] : Sincerely, [DrViktoriya  ___] : Dr. DELEON [FreeTextEntry3] : Jack Mendoza MD\par Professor, Cardiovascular & Thoracic Surgery\par [facility]\par Director of the [facility] \par Director of Thoracic Surgery, [facility]\par [facility]\par [address]\par [facility] [address]th [address]\par [place] [id number]\par Phone: [phone]\par Fax: [phone]

## 2020-10-19 NOTE — END OF VISIT
[FreeTextEntry3] : I, SUZY MAYORGA , am scribing for and in the presence of JULIANN LIZARRAGA the following sections: history of present illness, past medical/family/surgical/family/social history, review of systems, vital signs, physical exam, and disposition.\par

## 2022-08-18 NOTE — PATIENT PROFILE ADULT. - EXTENSIONS OF SELF_ADULT
[facility]    Who is the name of the provider?:  Yair      What is the location you see this provider at?: [place]    Reason for call:  Patient wants to proceed with surgery.     Can we leave a detailed message on this number?  YES        None